# Patient Record
Sex: FEMALE | Race: WHITE | NOT HISPANIC OR LATINO | Employment: STUDENT | URBAN - METROPOLITAN AREA
[De-identification: names, ages, dates, MRNs, and addresses within clinical notes are randomized per-mention and may not be internally consistent; named-entity substitution may affect disease eponyms.]

---
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
## 2021-05-16 ENCOUNTER — COMMUNICATION - HEALTHEAST (OUTPATIENT)
Dept: SCHEDULING | Facility: CLINIC | Age: 21
End: 2021-05-16

## 2021-05-16 DIAGNOSIS — N92.0 MENORRHAGIA WITH REGULAR CYCLE: ICD-10-CM

## 2021-05-18 RX ORDER — NORGESTIMATE AND ETHINYL ESTRADIOL
KIT
Qty: 84 TABLET | Refills: 0 | Status: SHIPPED | OUTPATIENT
Start: 2021-05-18 | End: 2021-08-18

## 2021-05-27 ASSESSMENT — PATIENT HEALTH QUESTIONNAIRE - PHQ9: SUM OF ALL RESPONSES TO PHQ QUESTIONS 1-9: 0

## 2021-05-29 NOTE — PROGRESS NOTES
[facility] Well Child Check    ASSESSMENT & PLAN  Olga Hinson is a 18  y.o. 5  m.o. who has normal growth and normal development.    Diagnoses and all orders for this visit:    Menorrhagia with regular cycle  We will start the patient on low-dose oral contraceptives today.  Discussed the risks and benefits of the medication.  No personal or family history of blood clots.  No personal history of headaches with aura.    Discussed how to take the pill.  She is not currently sexually active but did discuss that it will take 14 days to become an effective form of birth control.  Discussed that it will not prevent sexually transmitted diseases.  -     drospirenone-ethinyl estradiol (RAFFI, 28,) 3-0.02 mg per tablet; Take 1 tablet by mouth daily.  Dispense: 3 Package; Refill: 3    Encounter for routine child health examination without abnormal findings  -     Meningococcal MCV4P  -     HPV vaccine 9 valent 3 dose IM        Return to clinic in 1 year for a Well Child Check or sooner as needed    IMMUNIZATIONS/LABS  Immunizations were reviewed and orders were placed as appropriate.    REFERRALS  Dental:  Recommend routine dental care as appropriate.  Other:  No additional referrals were made at this time.    ANTICIPATORY GUIDANCE  I have reviewed age appropriate anticipatory guidance.    HEALTH HISTORY  Do you have any concerns that you'd like to discuss today?: Listed      Refills needed? No    Do you have any forms that need to be filled out? Yes Sports Px   Are you using a form of contraception? No    If no, would you like to discuss with your clinician today? Yes        Do you have any significant health concerns in your family history?: No  Family History   Problem Relation Age of Onset     No Medical Problems Mother      No Medical Problems Father      Since your last visit, have there been any major changes in your family, such as a move, job change, separation, divorce, or death in the family?: No  Has a lack of  transportation kept you from medical appointments?: No    Home  Who lives in your home?:  Mom, Dad, 2 Sisters and 1 Brother  Social History     Social History Narrative     Not on file     Do you have any concerns about losing your housing?: No  Is your housing safe and comfortable?: Yes  Do you have any trouble with sleep?:  No    Education  What school do you child attend?:  [employer] Currently  What grade are you in?:  11th  How do you perform in school (grades, behavior, attention, homework?: A's and B's Good student     Eating  Do you eat regular meals including fruits and vegetables?:  yes  What are you drinking (cow's milk, water, soda, juice, sports drinks, energy drinks, etc)?: water  Have you been worried that you don't have enough food?: No  Do you have concerns about your body or appearance?:  No    Activities  Do you have friends?:  yes  Do you get at least one hour of physical activity per day?:  no  How many hours a day are you in front of a screen other than for schoolwork (computer, TV, phone)?:  3  What do you do for exercise?:  Swimming in the [employer] [place]  Do you have interest/participate in community activities/volunteers/school sports?:  no    MENTAL HEALTH SCREENING  PHQ-2 Total Score: 0 (6/11/2019  5:14 PM)    PHQ-9 Total Score: 1 (6/11/2019  5:14 PM)      VISION/HEARING  Vision: Completed. See Results  Hearing:  Completed. See Results     Hearing Screening    125Hz 250Hz 500Hz 1000Hz 2000Hz 3000Hz 4000Hz 6000Hz 8000Hz   Right ear:   25 20 20  20 20    Left ear:   25 20 20  20 20       Visual Acuity Screening    Right eye Left eye Both eyes   Without correction:      With correction: 20/20 20/32    Comments: Plus Lens: Pass: blurring of vision with +2.50 lens glasses      TB Risk Assessment:  The patient and/or parent/guardian answer positive to:  patient and/or parent/guardian answer 'no' to all screening TB questions    Dyslipidemia Risk Screening  Have either of your parents or any of  "your grandparents had a stroke or heart attack before age 55?: No  Any parents with high cholesterol or currently taking medications to treat?: Yes: Dad might- she's not sure     Dental  When was the last time you saw the dentist?: 6-12 months ago   Parent/Guardian declines the fluoride varnish application today. Fluoride not applied today.  Aged out.    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  Blood Pressure: 108/60  Blood pressure percentiles are 35 % systolic and 21 % diastolic based on the 2017 AAP Clinical Practice Guideline. Blood pressure percentile targets: 90: 125/78, 95: 129/82, 95 + 12 mmH/94.    PHYSICAL EXAM      General: Awake, Alert and Cooperative   Head: Normocephalic and Atraumatic   Eyes: PERRL and EOMI   ENT: Normal pearly TMs bilaterally and Oropharynx clear   Neck: Supple and Thyroid without enlargement or nodules   Chest: Chest wall normal   Lungs: Clear to auscultation bilaterally   Heart:: Regular rate and rhythm and no murmurs   Abdomen: Soft, nontender, nondistended and no hepatosplenomegaly   :  normal external female genitalia   Spine: Spine straight without curvature noted    Musculoskeletal: Moving all extremities, Normal tone and Full range of motion of the extremities   Neuro: Alert and oriented times 3, Normal tone in upper and lower extremities and Grossly normal   Skin: No rashes or lesions noted                 "

## 2021-05-30 VITALS — HEIGHT: 67 IN | WEIGHT: 179 LBS | BODY MASS INDEX: 28.09 KG/M2

## 2021-05-30 NOTE — TELEPHONE ENCOUNTER
Medication Question or Clarification  Who is calling: Other: Patient's Mother  What medication are you calling about? (include dose and sig)   drospirenone-ethinyl estradiol (RAFFI, 28,) 3-0.02 mg per tablet 3 Package 3 6/10/2019     Sig - Route: Take 1 tablet by mouth daily. - Oral    Sent to pharmacy as: drospirenone-ethinyl estradiol (RAFFI, 28,) 3-0.02 mg per tablet        Who prescribed the medication?: Celia Harding MD  What is your question/concern?: Patient's mother stated due to their insurance, the above prescription will need to be re-submitted to [facility].  Pharmacy: [facility]  Okay to leave a detailed message?: Yes  Site CMT - Please call the pharmacy to obtain any additional needed information.

## 2021-06-03 VITALS — BODY MASS INDEX: 28.61 KG/M2 | HEIGHT: 67 IN | WEIGHT: 182.31 LBS

## 2021-06-04 VITALS
DIASTOLIC BLOOD PRESSURE: 76 MMHG | RESPIRATION RATE: 16 BRPM | WEIGHT: 160 LBS | BODY MASS INDEX: 25.11 KG/M2 | SYSTOLIC BLOOD PRESSURE: 131 MMHG | HEIGHT: 67 IN | HEART RATE: 90 BPM

## 2021-06-07 NOTE — PROGRESS NOTES
"Olga Hinson is a 19 y.o. female who is being evaluated via a billable telephone visit.      The patient has been notified of following:     \"This telephone visit will be conducted via a call between you and your physician/provider. We have found that certain health care needs can be provided without the need for a physical exam.  This service lets us provide the care you need with a short phone conversation.  If a prescription is necessary we can send it directly to your pharmacy.  If lab work is needed we can place an order for that and you can then stop by our lab to have the test done at a later time.    If during the course of the call the physician/provider feels a telephone visit is not appropriate, you will not be charged for this service.\"         Olga Hinson complains of    Chief Complaint   Patient presents with     Medication Refill       I have reviewed and updated the patient's Past Medical History, Social History, Family History and Medication List.    ALLERGIES  Patient has no known allergies.    Additional provider notes: Else is a very pleasant 19-year-old female who has requested a phone visit to discuss her medications.  The patient had been previously placed on Jenny last spring.  She took this medication with no difficulty until November when she started to feel very anxious.  She began to wonder if this was the medication and stopped taking it 3 weeks ago and her anxiety resolved.    She is still uncertain if it was the medication however she would like to try something else.  She is not currently sexually active.  She takes the medication for menstrual cycle control as well as for her acne.    She is not necessarily interested in an implant type of medication particularly with the COVID 19 current outbreak.  She may consider this sometime in the future.    Assessment/Plan:  1. Menorrhagia with regular cycle  Ortho Tri-Cyclen sent to the pharmacy.  She will contact me in the next month or 2 " if this is not working well if it is making her anxious and I am happy to switch her medications.  - norgestimate-ethinyl estradioL (ORTHO TRI-CYCLEN LO, 28,) 0.18/0.215/0.25 mg-25 mcg tablet; Take 1 tablet by mouth daily.  Dispense: 3 Package; Refill: 4        Phone call duration:  8 minutes    Celia Harding MD

## 2021-06-08 NOTE — PROGRESS NOTES
SUBJECTIVE:    This is a 16-year-old female brought to clinic by her mother as she has had a recent constellation of symptoms.  She has been complaining of a sore throat.  Also, she has been experiencing nausea and vomiting and her mother states that these symptoms are usually due to strep throat.  Other family members have been ill recently.  She denies ongoing cough.  She has not had ear pain.  She is not short of breath.  It does hurt to swallow.      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     No current facility-administered medications on file prior to visit.        No Known Allergies         A 12 point comprehensive review of systems was negative except as noted.      OBJECTIVE:     Vitals:    02/06/17 1516   BP: 100/64   Pulse: 116   Resp: 20   Temp: 99.8  F (37.7  C)       General: Alert, not acutely distressed   Head:  Atraumatic, normocephalic  Ears:  TMs normal pearly-gray  Eyes:  PERRL, extraocular movements are intact  Nose:  Septum midline  Throat:  Oral mucosa moist, oropharynx moderately erythematous without exudate  Neck:  Supple without adenopathy or thyromegaly   Cardiovascular: S1-S2 with regular rate and without murmur, rub, or gallop   Lungs:  Clear to auscultation bilaterally   Extremities: Without cyanosis or edema   Neuro:  CN II-XII appear intact        Laboratory Results:    Results for orders placed or performed in visit on 02/06/17   Rapid Strep A Screen-Throat   Result Value Ref Range    Rapid Strep A Antigen Group A Strep detected (!) No Group A Strep detected         ASSESSMENT AND PLAN:    1. Strep pharyngitis  2. Sore throat    Rapid strep test is positive  - Rapid Strep A Screen-Throat  Treat with  amoxicillin 500 mg by mouth twice a day ×10 days  Recommend increased fluids  Okay to give Tylenol or ibuprofen as needed  Follow-up recommended if not improving        Jose Raul Lopez MD      This note has been dictated and transcribed using voice recognition software.  Any grammatical or contextual distortions are unintentional and inherent to the software.

## 2021-06-10 NOTE — PROGRESS NOTES
[facility] Well Child Check    ASSESSMENT & PLAN  Olga Hinson is a 19 y.o. who has normal growth and normal development.    Diagnoses and all orders for this visit:    Routine general medical examination at a health care facility  -     HPV vaccine 9 valent 3 dose IM  -     Hearing Screening  -     Vision Screening  -     PHQ9 Depression Screen    Other orders  -     Meningococcal B (PF)    She is taking OCPs for dysmenorrhea.  She denies any sexual activity so declines chlamydia screening.    Return to clinic in 1 year for a Well Child Check or sooner as needed    IMMUNIZATIONS/LABS  Immunizations were reviewed and orders were placed as appropriate.    REFERRALS  Dental:  The patient has already established care with a dentist.  Other:  No additional referrals were made at this time.    ANTICIPATORY GUIDANCE  I have reviewed age appropriate anticipatory guidance.    HEALTH HISTORY  Do you have any concerns that you'd like to discuss today?: No concerns       Refills needed? No    Do you have any forms that need to be filled out? No        Do you have any significant health concerns in your family history?: No  Family History   Problem Relation Age of Onset     No Medical Problems Mother      No Medical Problems Father      Since your last visit, have there been any major changes in your family, such as a move, job change, separation, divorce, or death in the family?: No  Has a lack of transportation kept you from medical appointments?: No    Home  Who lives in your home?:  Mom Dad and siblings  Social History     Social History Narrative     Not on file     Do you have any concerns about losing your housing?: No  Is your housing safe and comfortable?: Yes  Do you have any trouble with sleep?:  No    Education  What school do you child attend?:  [employer]  What grade are you in?:  [employer]  How do you perform in school (grades, behavior, attention, homework?: Good     Eating  Do you eat regular  meals including fruits and vegetables?:  yes  What are you drinking (cow's milk, water, soda, juice, sports drinks, energy drinks, etc)?: cow's milk- 1%  Have you been worried that you don't have enough food?: No  Do you have concerns about your body or appearance?:  No    Activities  Do you have friends?:  yes  Do you get at least one hour of physical activity per day?:  yes  How many hours a day are you in front of a screen other than for schoolwork (computer, TV, phone)?:  3  What do you do for exercise?:  Gym - walks  Do you have interest/participate in community activities/volunteers/school sports?:  no    VISION/HEARING  Vision: Patient is already followed by a vision specialist  Hearing:  Completed. See Results     Hearing Screening    125Hz 250Hz 500Hz 1000Hz 2000Hz 3000Hz 4000Hz 6000Hz 8000Hz   Right ear:   25 20 20  20 20    Left ear:   25 20 20  20 20    Vision Screening Comments: See eye specialist annually - Last appt 7/27/2020    MENTAL HEALTH SCREENING  No flowsheet data found.  Social-emotional & mental health screening:   PHQ 8/10/2020   PHQ-9 Total Score 0   Q9: Thoughts of better off dead/self-harm past 2 weeks Not at all       No concerns    TB Risk Assessment:  The patient and/or parent/guardian answer positive to:  no known risk of TB    Dyslipidemia Risk Screening  Have either of your parents or any of your grandparents had a stroke or heart attack before age 55?: No  Any parents with high cholesterol or currently taking medications to treat?: No     Dental  When was the last time you saw the dentist?: 6-12 months ago   Parent/Guardian declines the fluoride varnish application today. Fluoride not applied today.    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  Blood Pressure: 131/76  Blood pressure percentiles are not available for patients who are 18 years or older.    PHYSICAL EXAM  Physical Exam     General: Awake, Alert and Cooperative   Head: Normocephalic and Atraumatic   Eyes: PERRL and EOMI   ENT: Normal pearly TMs bilaterally and Oropharynx clear   Neck: Supple and Thyroid without enlargement or nodules   Chest: Chest wall normal   Lungs: Clear to auscultation bilaterally   Heart:: Regular rate and rhythm and no murmurs   Abdomen: Soft, nontender, nondistended and no hepatosplenomegaly   : declined   Spine: Spine straight without curvature noted and Curvature of the spine noted on forward bending   Musculoskeletal: Moving all extremities and No pain in the extremities   Neuro: Alert and oriented times 3, Normal tone in upper and lower extremities, Cranial nerves 2-12 intact and Grossly normal   Skin: No lesions or rashes noted       "

## 2021-06-17 NOTE — TELEPHONE ENCOUNTER
Refill Approved    Rx renewed per Medication Renewal Policy. Medication was last renewed on 4/14/2020.    Ov: 8/10/2020    Sally Soares, [facility] Triage/Med Refill 5/18/2021     Requested Prescriptions   Pending Prescriptions Disp Refills     TRI-LO-CON 0.18/0.215/0.25 mg-25 mcg tablet [Pharmacy Med Name: NORG/E ES(TRI-LO-CON)TB 28] 84 tablet 3     Sig: TAKE 1 TABLET DAILY       Oral Contraceptives Protocol Passed - 5/16/2021 11:46 PM        Passed - Visit with PCP or prescribing provider visit in last 12 months      Last office visit with prescriber/PCP: Visit date not found OR same dept: Visit date not found OR same specialty: Visit date not found  Last physical: 6/10/2019 Last MTM visit: Visit date not found   Next visit within 3 mo: Visit date not found  Next physical within 3 mo: Visit date not found  Prescriber OR PCP: Celia Harding MD  Last diagnosis associated with med order: 1. Menorrhagia with regular cycle  - TRI-LO-CON 0.18/0.215/0.25 mg-25 mcg tablet [Pharmacy Med Name: NORG/E ES(TRI-LO-CON)TB 28]; TAKE 1 TABLET DAILY  Dispense: 84 tablet; Refill: 3    If protocol passes may refill for 12 months if within 3 months of last provider visit (or a total of 15 months).

## 2021-06-18 NOTE — PATIENT INSTRUCTIONS - HE
Patient Instructions by Kristie Claire MD at 8/10/2020 11:00 AM     Author: Kristie Claire MD Service: -- Author Type: Physician    Filed: 8/10/2020 11:14 AM Encounter Date: 8/10/2020 Status: Signed    : Kristie Claire MD (Physician)          Patient Education      [facility] HANDOUT- PATIENT  18 THROUGH 21 YEAR VISITS  Here are some suggestions from [employer] experts that may be of value to your family.     HOW YOU ARE DOING  Enjoy spending time with your family.  Find activities you are really interested in, such as sports, theater, or volunteering.  Try to be responsible for your schoolwork or work obligations.  Always talk through problems and never use violence.  If you get angry with someone, try to walk away.  If you feel unsafe in your home or have been hurt by someone, let us know. Hotlines and community agencies can also provide confidential help.  Talk with us if you are worried about your living or food situation. Community agencies and programs such as SNAP can help.  Dont smoke, vape, or use drugs. Avoid people who do when you can. Talk with us if you are worried about alcohol or drug use in your family.    YOUR DAILY LIFE  Visit the dentist at least twice a year.  Brush your teeth at least twice a day and floss once a day.  Be a healthy eater.  Have vegetables, fruits, lean protein, and whole grains at meals and snacks.  Limit fatty, sugary, salty foods that are low in nutrients, such as candy, chips, and ice cream.  Eat when youre hungry. Stop when you feel satisfied.  Eat breakfast.  Drink plenty of water.  Make sure to get enough calcium every day.  Have 3 or more servings of low-fat (1%) or fat-free milk and other low-fat dairy products, such as yogurt and cheese.  Women: Make sure to eat foods rich in folate, such as fortified grains and dark- green leafy vegetables.  Aim for at least 1 hour of physical activity every day.  Wear safety equipment when you play sports.  Get enough  sleep.  Talk with us about managing your health care and insurance as an adult.    YOUR FEELINGS  Most people have ups and downs. If you are feeling sad, depressed, nervous, irritable, hopeless, or angry, let us know or reach out to another health care professional.  Figure out healthy ways to deal with stress.  Try your best to solve problems and make decisions on your own.  Sexuality is an important part of your life. If you have any questions or concerns, we are here for you.    HEALTHY BEHAVIOR CHOICES  Avoid using drugs, alcohol, tobacco, steroids, and diet pills. Support friends who choose not to use.  If you use drugs or alcohol, let us know or talk with another trusted adult about it. We can help you with quitting or cutting down on your use.  Make healthy decisions about your sexual behavior.  If you are sexually active, always practice safe sex. Always use birth control along with a condom to prevent pregnancy and sexually transmitted infections.  All sexual activity should be something you want. No one should ever force or try to convince you.  Protect your hearing at work, home, and concerts. Keep your earbud volume down.    STAYING SAFE  Always be a safe and cautious .  Insist that everyone use a lap and shoulder seat belt.  Limit the number of friends in the car and avoid driving at night.  Avoid distractions. Never text or talk on the phone while you drive.  Do not ride in a vehicle with someone who has been using drugs or alcohol.  If you feel unsafe driving or riding with someone, call someone you trust to drive you.  Wear helmets and protective gear while playing sports. Wear a helmet when riding a bike, a motorcycle, or an ATV or when skiing or skateboarding.  Always use sunscreen and a hat when youre outside.  Fighting and carrying weapons can be dangerous. Talk with your parents, teachers, or doctor about how to avoid these situations.      Helpful Resources:  National Domestic Violence  Hotline: 764.957.1505   Consistent with Bright Futures: Guidelines for Health Supervision of Infants, Children, and Adolescents, 4th Edition  For more information, go to https://brightfutures.aap.org.

## 2021-06-20 ENCOUNTER — HEALTH MAINTENANCE LETTER (OUTPATIENT)
Age: 21
End: 2021-06-20

## 2021-08-15 DIAGNOSIS — N92.0 MENORRHAGIA WITH REGULAR CYCLE: ICD-10-CM

## 2021-08-18 RX ORDER — NORGESTIMATE AND ETHINYL ESTRADIOL 7DAYSX3 LO
1 KIT ORAL DAILY
Qty: 84 TABLET | Refills: 0 | Status: SHIPPED | OUTPATIENT
Start: 2021-08-18 | End: 2021-12-26

## 2021-08-18 NOTE — TELEPHONE ENCOUNTER
"Routing refill request to provider for review/approval because:  Patient needs to be seen because it has been more than 1 year since last office visit.  No established PCP    Last Written Prescription Date:  5/18/21  Last Fill Quantity: 84,  # refills: 0   Last office visit provider:  8/10/20     Requested Prescriptions   Pending Prescriptions Disp Refills     TRI-LO-CON 0.18/0.215/0.25 MG-25 MCG tablet [Pharmacy Med Name: IAN/E ES(TRI-LO-CON)TB 28] 84 tablet 3     Sig: TAKE 1 TABLET DAILY       Contraceptives Protocol Failed - 8/15/2021 11:30 PM        Failed - Recent (12 mo) or future (30 days) visit within the authorizing provider's specialty     Patient has had an office visit with the authorizing provider or a provider within the authorizing providers department within the previous 12 mos or has a future within next 30 days. See \"Patient Info\" tab in inbasket, or \"Choose Columns\" in Meds & Orders section of the refill encounter.              Passed - Patient is not a current smoker if age is 35 or older        Passed - Medication is active on med list        Passed - No active pregnancy on record        Passed - No positive pregnancy test in past 12 months             giuliana pryor RN 08/17/21 8:48 PM  "

## 2021-10-11 ENCOUNTER — HEALTH MAINTENANCE LETTER (OUTPATIENT)
Age: 21
End: 2021-10-11

## 2021-12-26 ENCOUNTER — MYC REFILL (OUTPATIENT)
Dept: FAMILY MEDICINE | Facility: CLINIC | Age: 21
End: 2021-12-26
Payer: COMMERCIAL

## 2021-12-26 DIAGNOSIS — N92.0 MENORRHAGIA WITH REGULAR CYCLE: ICD-10-CM

## 2021-12-27 RX ORDER — NORGESTIMATE AND ETHINYL ESTRADIOL 7DAYSX3 LO
1 KIT ORAL DAILY
Qty: 84 TABLET | Refills: 0 | Status: SHIPPED | OUTPATIENT
Start: 2021-12-27 | End: 2022-02-11

## 2021-12-27 NOTE — TELEPHONE ENCOUNTER
Routing refill request to provider for review/approval because:  Patient needs to be seen because it has been more than 1 year since last office visit.    Earl Maya RN

## 2022-02-11 ENCOUNTER — OFFICE VISIT (OUTPATIENT)
Dept: FAMILY MEDICINE | Facility: CLINIC | Age: 22
End: 2022-02-11
Payer: COMMERCIAL

## 2022-02-11 VITALS
OXYGEN SATURATION: 99 % | HEART RATE: 100 BPM | DIASTOLIC BLOOD PRESSURE: 82 MMHG | WEIGHT: 169 LBS | BODY MASS INDEX: 26.53 KG/M2 | HEIGHT: 67 IN | SYSTOLIC BLOOD PRESSURE: 124 MMHG | RESPIRATION RATE: 16 BRPM | TEMPERATURE: 99.8 F

## 2022-02-11 DIAGNOSIS — Z00.00 HEALTHCARE MAINTENANCE: Primary | ICD-10-CM

## 2022-02-11 DIAGNOSIS — N92.0 MENORRHAGIA WITH REGULAR CYCLE: ICD-10-CM

## 2022-02-11 PROCEDURE — 90471 IMMUNIZATION ADMIN: CPT | Performed by: FAMILY MEDICINE

## 2022-02-11 PROCEDURE — 90651 9VHPV VACCINE 2/3 DOSE IM: CPT | Performed by: FAMILY MEDICINE

## 2022-02-11 PROCEDURE — 99395 PREV VISIT EST AGE 18-39: CPT | Mod: 25 | Performed by: FAMILY MEDICINE

## 2022-02-11 RX ORDER — NORGESTIMATE AND ETHINYL ESTRADIOL 7DAYSX3 LO
1 KIT ORAL DAILY
Qty: 84 TABLET | Refills: 4 | Status: SHIPPED | OUTPATIENT
Start: 2022-02-11 | End: 2022-06-08

## 2022-02-11 ASSESSMENT — ENCOUNTER SYMPTOMS
HEMATURIA: 0
SORE THROAT: 0
FEVER: 0
JOINT SWELLING: 0
HEADACHES: 0
DYSURIA: 0
CONSTIPATION: 0
WEAKNESS: 0
COUGH: 0
HEMATOCHEZIA: 0
SHORTNESS OF BREATH: 0
PALPITATIONS: 0
NERVOUS/ANXIOUS: 0
HEARTBURN: 0
DIZZINESS: 0
CHILLS: 0
FREQUENCY: 0
EYE PAIN: 0
PARESTHESIAS: 0
NAUSEA: 0
MYALGIAS: 0
DIARRHEA: 0
ABDOMINAL PAIN: 0
ARTHRALGIAS: 0
BREAST MASS: 0

## 2022-02-11 ASSESSMENT — MIFFLIN-ST. JEOR: SCORE: 1569.21

## 2022-02-11 NOTE — PROGRESS NOTES
Answers for HPI/ROS submitted by the patient on 2/11/2022  Frequency of exercise:: 1 day/week  Getting at least 3 servings of Calcium per day:: Yes  Diet:: Regular (no restrictions)  Taking medications regularly:: Yes  Medication side effects:: None  Bi-annual eye exam:: Yes  Dental care twice a year:: Yes  Sleep apnea or symptoms of sleep apnea:: None  abdominal pain: No  Blood in stool: No  Blood in urine: No  chest pain: No  chills: No  congestion: No  constipation: No  cough: No  diarrhea: No  dizziness: No  ear pain: No  eye pain: No  nervous/anxious: No  fever: No  frequency: No  genital sores: No  headaches: No  hearing loss: No  heartburn: No  arthralgias: No  joint swelling: No  peripheral edema: No  mood changes: No  myalgias: No  nausea: No  dysuria: No  palpitations: No  Skin sensation changes: No  sore throat: No  urgency: No  rash: No  shortness of breath: No  visual disturbance: No  weakness: No  pelvic pain: No  vaginal bleeding: No  vaginal discharge: No  tenderness: No  breast mass: No  breast discharge: No  Additional concerns today:: No  Duration of exercise:: Less than 15 minutes        Assessment/Plan:     Health maintenance female exam.  All questions answered.  Breast self exam technique reviewed and patient encouraged to perform self-exam monthly.      Healthcare maintenance    Menorrhagia with regular cycle  - norgestim-eth estrad triphasic (TRI-LO-CON) 0.18/0.215/0.25 MG-25 MCG tablet  Dispense: 84 tablet; Refill: 4        Patient has been advised of split billing requirements and indicates understanding: Yes      Subjective:     Olga Hinson is a 21 year old female who presents for an annual exam.  She is overall doing well.  No questions/concerns.  Needs refill of OCP which she takes for irregular/heavy cycles.    Healthy Habits:   Regular Exercise: Yes  Sunscreen Use: Yes  Healthy Diet: yes  Dental Visits Regularly: yes  Seat Belt: Yes  Self Breast Exam Monthly:  occasional  Prevention of Osteoporosis: yes    Immunization History   Administered Date(s) Administered     COVID-19,PF,Pfizer (12+ Yrs) 08/09/2021, 08/30/2021     DTAP (<7y) 02/20/2002, 04/22/2002, 07/03/2002, 04/02/2003, 12/27/2006     FLU 6-35 months 09/24/2009     Flu, Unspecified 10/19/2007, 10/18/2008     HPV9 06/10/2019, 08/10/2020, 02/11/2022     HepA-Adult 08/21/2014     HepA-ped 2 Dose 11/02/2015     HepB, Unspecified 02/20/2002, 04/22/2002, 04/02/2003     Hib, Unspecified 02/20/2002, 04/22/2002, 04/02/2003     Influenza (IIV3) PF 12/03/2005     Influenza Vaccine, 6+MO IM (QUADRIVALENT W/PRESERVATIVES) 11/05/2010, 10/26/2011, 10/17/2012, 11/02/2020     MMR 01/02/2003, 12/27/2006     Meningococcal (Bexsero ) 08/10/2020, 09/24/2020     Meningococcal (Menactra ) 07/23/2010, 08/21/2014, 06/10/2019     Pneumococcal (PCV 7) 02/20/2002, 04/22/2002, 07/03/2002     Poliovirus, inactivated (IPV) 02/20/2002, 07/03/2002, 12/27/2006, 08/21/2014     Tdap (Adacel,Boostrix) 07/23/2010, 08/21/2014     Varicella 02/16/2005, 12/27/2006         Gynecologic History  Patient's last menstrual period was 01/11/2022 (within days).  Contraception: oral contraceptive      OB History   No obstetric history on file.       Current Outpatient Medications   Medication Sig Dispense Refill     norgestim-eth estrad triphasic (TRI-LO-CON) 0.18/0.215/0.25 MG-25 MCG tablet Take 1 tablet by mouth daily 84 tablet 4     Past Medical History:   Diagnosis Date     Closed Fracture Of The Distal End Of The Radius     Created by Conversion      Past Surgical History:   Procedure Laterality Date     WISDOM TOOTH EXTRACTION       Patient has no known allergies.  Family History   Problem Relation Age of Onset     No Known Problems Mother      No Known Problems Father      Social History     Socioeconomic History     Marital status: Single     Spouse name: Not on file     Number of children: Not on file     Years of education: Not on file     Highest  "education level: Not on file   Occupational History     Not on file   Tobacco Use     Smoking status: Never Smoker     Smokeless tobacco: Never Used   Substance and Sexual Activity     Alcohol use: Never     Drug use: Not on file     Sexual activity: Never   Other Topics Concern     Not on file   Social History Narrative     Not on file     Social Determinants of Health     Financial Resource Strain: Not on file   Food Insecurity: Not on file   Transportation Needs: Not on file   Physical Activity: Not on file   Stress: Not on file   Social Connections: Not on file   Intimate Partner Violence: Not on file   Housing Stability: Not on file       Review of Systems  12 point review of systems was completed and found to be negative except for what is been stated above.      Objective:      Vitals:    02/11/22 1402 02/11/22 1413   BP: (!) 142/68 124/82   Pulse: 100    Resp: 16    Temp: 99.8  F (37.7  C)    TempSrc: Tympanic    SpO2: 99%    Weight: 76.7 kg (169 lb)    Height: 1.702 m (5' 7\")          Physical Exam:  General Appearance: Alert, cooperative, no distress, appears stated age   Head: Normocephalic, without obvious abnormality, atraumatic  Eyes: PERRL, conjunctiva/corneas clear, EOM's intact   Ears: Normal TM's and external ear canals, both ears  Neck: Supple, symmetrical, trachea midline, no adenopathy;  thyroid: not enlarged, symmetric, no tenderness/mass/nodules  Back: Symmetric, no curvature, ROM normal,  Lungs: Clear to auscultation bilaterally, respirations unlabored  Breasts: No breast masses, tenderness, asymmetry, or nipple discharge.  Heart: Regular rate and rhythm, S1 and S2 normal, no murmur, rub, or gallop  Abdomen: Soft, non-tender, bowel sounds active all four quadrants,  no masses, no organomegaly  Extremities: Extremities normal, atraumatic, no cyanosis or edema  Skin: Skin color, texture, turgor normal, no rashes or lesions  Lymph nodes: Cervical, supraclavicular, and axillary nodes normal and "   Neurologic: Normal

## 2022-04-24 ASSESSMENT — ANXIETY QUESTIONNAIRES
4. TROUBLE RELAXING: NOT AT ALL
3. WORRYING TOO MUCH ABOUT DIFFERENT THINGS: MORE THAN HALF THE DAYS
1. FEELING NERVOUS, ANXIOUS, OR ON EDGE: SEVERAL DAYS
7. FEELING AFRAID AS IF SOMETHING AWFUL MIGHT HAPPEN: SEVERAL DAYS
2. NOT BEING ABLE TO STOP OR CONTROL WORRYING: SEVERAL DAYS
6. BECOMING EASILY ANNOYED OR IRRITABLE: NOT AT ALL
GAD7 TOTAL SCORE: 7
GAD7 TOTAL SCORE: 7
5. BEING SO RESTLESS THAT IT IS HARD TO SIT STILL: MORE THAN HALF THE DAYS
7. FEELING AFRAID AS IF SOMETHING AWFUL MIGHT HAPPEN: SEVERAL DAYS
GAD7 TOTAL SCORE: 7

## 2022-04-25 ENCOUNTER — VIRTUAL VISIT (OUTPATIENT)
Dept: FAMILY MEDICINE | Facility: CLINIC | Age: 22
End: 2022-04-25
Payer: COMMERCIAL

## 2022-04-25 DIAGNOSIS — F41.9 ANXIETY: Primary | ICD-10-CM

## 2022-04-25 PROCEDURE — 99214 OFFICE O/P EST MOD 30 MIN: CPT | Mod: GT | Performed by: FAMILY MEDICINE

## 2022-04-25 RX ORDER — FLUOXETINE 10 MG/1
CAPSULE ORAL
Qty: 50 CAPSULE | Refills: 0 | Status: SHIPPED | OUTPATIENT
Start: 2022-04-25 | End: 2023-02-24 | Stop reason: DRUGHIGH

## 2022-04-25 ASSESSMENT — ANXIETY QUESTIONNAIRES: GAD7 TOTAL SCORE: 7

## 2022-04-25 NOTE — PROGRESS NOTES
"Olga is a 21 year old who is being evaluated via a billable video visit.      How would you like to obtain your AVS? [email or web address]  If the video visit is dropped, the invitation should be resent by: Text to cell phone: [phone]  Will anyone else be joining your video visit? No      Video Start Time: 12:52 PM    -------------------------    Assessment/Plan:    Olga Hinson is a 21 year old female presenting for:    Anxiety  Fluoxetine sent to the pharmacy.  We also discussed Vistaril as an \"as needed\" medication however she declined at this time.  Discussed that I think therapy would be helpful as well but she declines.  We discussed risks and benefits of the medication.  Discussed how to take the medication.  She will take 1 capsule daily for 10 days and then increase to 2 capsules daily thereafter.  She will follow-up with me via [employer] message in 3 to 4 weeks to let me know how she is doing.  - FLUoxetine (PROZAC) 10 MG capsule  Dispense: 50 capsule; Refill: 0        There are no discontinued medications.        Chief Complaint:  Anxiety          Subjective:   Olga Hinson is a pleasant 21 year old female being evaluated via video visit today for the following concern/s:     Anxiety: Patient has a past medical history significant for anxiety.  She is never been treated in the past.  She notes that her anxiety became a bit worse in high school.  She remembers feeling very anxious about test.  She states that the pandemic has made things worse.  She feels now that she gets anxious going out and doing things.  She states that she gets a feeling that \"something bad is going to happen.\"    She gives example that she had a her appointment a few days ago which she was looking forward to.  Unfortunately she began to feel very anxious and she needed to cancel.  She still states that sometimes when she goes out with her friends she feels anxious.    She is a good support system.  When she is at home she does not feel " overly anxious.  She states that when she is in high school she sometimes have panic episodes but she is not having this currently.  She is sleeping fairly well.      12 point review of systems completed and negative except for what has been described above    History   Smoking Status     Never Smoker   Smokeless Tobacco     Never Used         Current Outpatient Medications:      FLUoxetine (PROZAC) 10 MG capsule, Take 1 capsule (10 mg) by mouth daily for 10 days, THEN 2 capsules (20 mg) daily for 20 days., Disp: 50 capsule, Rfl: 0     norgestim-eth estrad triphasic (TRI-LO-CON) 0.18/0.215/0.25 MG-25 MCG tablet, Take 1 tablet by mouth daily, Disp: 84 tablet, Rfl: 4        Objective:  No vitals were done due to the nature of this visit  No flowsheet data found.            General: No acute distress  Psych: Appropriate affect  HEENT: moist mucous membranes  Pulmonary: Breathing comfortably, speaking in complete sentences  Extremities: warm and well perfused with no edema  Skin: warm and dry with no rash         This note has been dictated and transcribed using voice recognition software.   Any errors in transcription are unintentional and inherent to the software.       Video-Visit Details    Type of service:  Video Visit    Video End Time:1:04 PM    Originating Location (pt. Location): Home    Distant Location (provider location):  [facility]     Platform used for Video Visit: prollie for HPI/ROS submitted by the patient on 4/24/2022  ANTOINETTE 7 TOTAL SCORE: 7  Depression/Anxiety: Anxiety  Anxiety since last: : medium  Other associated symotome: : No  Significant life event: : No  Anxious:: Yes  Current substance use:: No  How many servings of fruits and vegetables do you eat daily?: 4 or more  On average, how many sweetened beverages do you drink each day (Examples: soda, juice, sweet tea, etc.  Do NOT count diet or artificially sweetened beverages)?: 1  How many minutes a day do you exercise  enough to make your heart beat faster?: 30 to 60  How many days a week do you exercise enough to make your heart beat faster?: 6  How many days per week do you miss taking your medication?: 0

## 2022-06-08 ENCOUNTER — TELEPHONE (OUTPATIENT)
Dept: FAMILY MEDICINE | Facility: CLINIC | Age: 22
End: 2022-06-08
Payer: COMMERCIAL

## 2022-06-08 DIAGNOSIS — N92.0 MENORRHAGIA WITH REGULAR CYCLE: ICD-10-CM

## 2022-06-08 RX ORDER — NORGESTIMATE AND ETHINYL ESTRADIOL 7DAYSX3 LO
1 KIT ORAL DAILY
Qty: 84 TABLET | Refills: 3 | Status: SHIPPED | OUTPATIENT
Start: 2022-06-08 | End: 2023-02-24

## 2022-06-08 NOTE — TELEPHONE ENCOUNTER
Prescription approved per [facility] Refill Protocol.    Call placed to [facility]   Detailed voicemail left  Cancelled remaining refills   Clinic RN call back number [phone] provided if questions/concerns    Earl Maya RN

## 2022-06-08 NOTE — TELEPHONE ENCOUNTER
[facility] - norgestim-eth estrad triphasic (TRI-LO-CON) 0.18/0.215/0.25 MG-25 MCG tablet  No need to call patient back, unless there are questions or problems.

## 2022-09-24 ENCOUNTER — HEALTH MAINTENANCE LETTER (OUTPATIENT)
Age: 22
End: 2022-09-24

## 2022-11-30 DIAGNOSIS — F41.9 ANXIETY: ICD-10-CM

## 2023-02-17 ASSESSMENT — ENCOUNTER SYMPTOMS
HEARTBURN: 0
NERVOUS/ANXIOUS: 0
ABDOMINAL PAIN: 0
FREQUENCY: 0
FEVER: 0
DYSURIA: 0
SORE THROAT: 0
JOINT SWELLING: 0
DIZZINESS: 0
ARTHRALGIAS: 0
MYALGIAS: 0
COUGH: 0
BREAST MASS: 0
HEMATURIA: 0
NAUSEA: 0
HEADACHES: 0
SHORTNESS OF BREATH: 0
CHILLS: 0
PARESTHESIAS: 0
DIARRHEA: 0
CONSTIPATION: 0
WEAKNESS: 0
PALPITATIONS: 0
HEMATOCHEZIA: 0
EYE PAIN: 0

## 2023-02-24 ENCOUNTER — OFFICE VISIT (OUTPATIENT)
Dept: FAMILY MEDICINE | Facility: CLINIC | Age: 23
End: 2023-02-24
Payer: COMMERCIAL

## 2023-02-24 VITALS
DIASTOLIC BLOOD PRESSURE: 80 MMHG | OXYGEN SATURATION: 100 % | BODY MASS INDEX: 25.43 KG/M2 | RESPIRATION RATE: 20 BRPM | WEIGHT: 162 LBS | HEIGHT: 67 IN | HEART RATE: 103 BPM | TEMPERATURE: 98.2 F | SYSTOLIC BLOOD PRESSURE: 118 MMHG

## 2023-02-24 DIAGNOSIS — N92.0 MENORRHAGIA WITH REGULAR CYCLE: ICD-10-CM

## 2023-02-24 DIAGNOSIS — Z12.4 CERVICAL CANCER SCREENING: ICD-10-CM

## 2023-02-24 DIAGNOSIS — Z00.00 HEALTHCARE MAINTENANCE: Primary | ICD-10-CM

## 2023-02-24 DIAGNOSIS — F41.9 ANXIETY: ICD-10-CM

## 2023-02-24 PROCEDURE — 99395 PREV VISIT EST AGE 18-39: CPT | Performed by: FAMILY MEDICINE

## 2023-02-24 PROCEDURE — G0145 SCR C/V CYTO,THINLAYER,RESCR: HCPCS | Performed by: FAMILY MEDICINE

## 2023-02-24 RX ORDER — NORGESTIMATE AND ETHINYL ESTRADIOL 7DAYSX3 LO
1 KIT ORAL DAILY
Qty: 84 TABLET | Refills: 3 | Status: SHIPPED | OUTPATIENT
Start: 2023-02-24 | End: 2024-03-01

## 2023-02-24 NOTE — PROGRESS NOTES
"Answers for HPI/ROS submitted by the patient on 2/17/2023  Frequency of exercise:: 4-5 days/week  Getting at least 3 servings of Calcium per day:: Yes  Diet:: Regular (no restrictions)  Taking medications regularly:: No  Bi-annual eye exam:: Yes  Dental care twice a year:: Yes  Sleep apnea or symptoms of sleep apnea:: None  abdominal pain: No  Blood in stool: No  Blood in urine: No  chest pain: No  chills: No  congestion: No  constipation: No  cough: No  diarrhea: No  dizziness: No  ear pain: No  eye pain: No  nervous/anxious: No  fever: No  frequency: No  genital sores: No  headaches: No  hearing loss: No  heartburn: No  arthralgias: No  joint swelling: No  peripheral edema: No  mood changes: No  myalgias: No  nausea: No  dysuria: No  palpitations: No  Skin sensation changes: No  sore throat: No  urgency: No  rash: No  shortness of breath: No  visual disturbance: No  weakness: No  pelvic pain: No  vaginal bleeding: No  vaginal discharge: No  tenderness: No  breast mass: No  breast discharge: No  Additional concerns today:: No  Duration of exercise:: 30-45 minutes        Assessment/Plan:     Health maintenance female exam.  All questions answered.  Await pap smear results.  Breast self exam technique reviewed and patient encouraged to perform self-exam monthly.  Discussed healthy lifestyle modifications.      BMI:   Estimated body mass index is 25.37 kg/m  as calculated from the following:    Height as of this encounter: 1.702 m (5' 7\").    Weight as of this encounter: 73.5 kg (162 lb).   Weight management plan: Discussed healthy diet and exercise guidelines      Healthcare maintenance  Is not currently and has not been sexually active and does not need gonorrhea chlamydia screening    Cervical cancer screening  - Pap Screen only - recommended age 21 - 24 years    Anxiety  Refill Prozac sent to the pharmacy  - FLUoxetine (PROZAC) 20 MG capsule  Dispense: 90 capsule; Refill: 3    Menorrhagia with regular " cycle  Refill  - norgestim-eth estrad triphasic (TRI-LO-CON) 0.18/0.215/0.25 MG-25 MCG tablet  Dispense: 84 tablet; Refill: 3        Patient has been advised of split billing requirements and indicates understanding: Yes      Subjective:     Olga Hinson is a 22 year old female who presents for an annual exam.  She is overall doing well.  She is currently in school for education.    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  She is currently working at school in [place] which she finds to be challenging but rewarding.    Healthy Habits:   Regular Exercise: Yes  Sunscreen Use: Yes  Healthy Diet: yes  Dental Visits Regularly: yes  Seat Belt: Yes  Self Breast Exam Monthly: no  Prevention of Osteoporosis: yes    Immunization History   Administered Date(s) Administered     COVID-19 Vaccine 12+ (Pfizer) 08/09/2021, 08/30/2021     DTAP (<7y) 02/20/2002, 04/22/2002, 07/03/2002, 04/02/2003, 12/27/2006     FLU 6-35 months 09/24/2009     Flu, Unspecified 10/19/2007, 10/18/2008     HPV9 06/10/2019, 08/10/2020, 02/11/2022     HepA-Adult 08/21/2014     HepA-ped 2 Dose 11/02/2015     HepB, Unspecified 02/20/2002, 04/22/2002, 04/02/2003     Hib, Unspecified 02/20/2002, 04/22/2002, 04/02/2003     Influenza (IIV3) PF 12/03/2005     Influenza Vaccine, 6+MO IM (QUADRIVALENT W/PRESERVATIVES) 11/05/2010, 10/26/2011, 10/17/2012, 11/02/2020     MMR 01/02/2003, 12/27/2006     Meningococcal ACWY (Menactra ) 07/23/2010, 08/21/2014, 06/10/2019     Meningococcal B (Bexsero ) 08/10/2020, 09/24/2020     Pneumococcal (PCV 7) 02/20/2002, 04/22/2002, 07/03/2002     Poliovirus, inactivated (IPV) 02/20/2002, 07/03/2002, 12/27/2006, 08/21/2014     Tdap (Adacel,Boostrix) 07/23/2010, 08/21/2014     Varicella 02/16/2005, 12/27/2006         Gynecologic History  Patient's last menstrual period was 02/10/2023 (approximate).  Contraception: abstinence  Last Pap: n/a.     OB History   No obstetric history on file.       Current Outpatient Medications  "  Medication Sig Dispense Refill     FLUoxetine (PROZAC) 20 MG capsule Take 1 capsule (20 mg) by mouth daily 90 capsule 3     norgestim-eth estrad triphasic (TRI-LO-CON) 0.18/0.215/0.25 MG-25 MCG tablet Take 1 tablet by mouth daily 84 tablet 3     Past Medical History:   Diagnosis Date     Closed Fracture Of The Distal End Of The Radius     Created by Conversion      Past Surgical History:   Procedure Laterality Date     WISDOM TOOTH EXTRACTION       Patient has no known allergies.  Family History   Problem Relation Age of Onset     No Known Problems Mother      No Known Problems Father      Social History     Socioeconomic History     Marital status: Single     Spouse name: Not on file     Number of children: Not on file     Years of education: Not on file     Highest education level: Not on file   Occupational History     Not on file   Tobacco Use     Smoking status: Never     Smokeless tobacco: Never   Substance and Sexual Activity     Alcohol use: Never     Drug use: Not on file     Sexual activity: Never   Other Topics Concern     Not on file   Social History Narrative     Not on file     Social Determinants of Health     Financial Resource Strain: Not on file   Food Insecurity: Not on file   Transportation Needs: Not on file   Physical Activity: Not on file   Stress: Not on file   Social Connections: Not on file   Intimate Partner Violence: Not on file   Housing Stability: Not on file       Review of Systems  12 point review of systems was completed and found to be negative except for what is been stated above.      Objective:      Vitals:    02/24/23 1325   BP: 118/80   Pulse: 103   Resp: 20   Temp: 98.2  F (36.8  C)   TempSrc: Tympanic   SpO2: 100%   Weight: 73.5 kg (162 lb)   Height: 1.702 m (5' 7\")         Physical Exam:  General Appearance: Alert, cooperative, no distress, appears stated age   Head: Normocephalic, without obvious abnormality, atraumatic  Eyes: PERRL, conjunctiva/corneas clear, EOM's " intact   Ears: Normal TM's and external ear canals, both ears  Neck: Supple, symmetrical, trachea midline, no adenopathy;  thyroid: not enlarged, symmetric, no tenderness/mass/nodules  Back: Symmetric, no curvature, ROM normal,  Lungs: Clear to auscultation bilaterally, respirations unlabored  Breasts: No breast masses, tenderness, asymmetry, or nipple discharge.  Heart: Regular rate and rhythm, S1 and S2 normal, no murmur, rub, or gallop  Abdomen: Soft, non-tender, bowel sounds active all four quadrants,  no masses, no organomegaly  Pelvic:normal external female genitalia, normal appearing vaginal mucosa and cervix  Extremities: Extremities normal, atraumatic, no cyanosis or edema  Skin: Skin color, texture, turgor normal, no rashes or lesions  Lymph nodes: Cervical, supraclavicular, and axillary nodes normal and   Neurologic: Normal

## 2023-02-28 LAB
BKR LAB AP GYN ADEQUACY: NORMAL
BKR LAB AP GYN INTERPRETATION: NORMAL
BKR LAB AP HPV REFLEX: NO
BKR LAB AP PREVIOUS ABNORMAL: NORMAL
PATH REPORT.COMMENTS IMP SPEC: NORMAL
PATH REPORT.COMMENTS IMP SPEC: NORMAL
PATH REPORT.RELEVANT HX SPEC: NORMAL

## 2024-01-25 ENCOUNTER — PATIENT OUTREACH (OUTPATIENT)
Dept: CARE COORDINATION | Facility: CLINIC | Age: 24
End: 2024-01-25
Payer: COMMERCIAL

## 2024-02-08 ENCOUNTER — PATIENT OUTREACH (OUTPATIENT)
Dept: CARE COORDINATION | Facility: CLINIC | Age: 24
End: 2024-02-08
Payer: COMMERCIAL

## 2024-02-19 DIAGNOSIS — F41.9 ANXIETY: ICD-10-CM

## 2024-02-28 SDOH — HEALTH STABILITY: PHYSICAL HEALTH: ON AVERAGE, HOW MANY MINUTES DO YOU ENGAGE IN EXERCISE AT THIS LEVEL?: 40 MIN

## 2024-02-28 SDOH — HEALTH STABILITY: PHYSICAL HEALTH: ON AVERAGE, HOW MANY DAYS PER WEEK DO YOU ENGAGE IN MODERATE TO STRENUOUS EXERCISE (LIKE A BRISK WALK)?: 6 DAYS

## 2024-02-28 ASSESSMENT — SOCIAL DETERMINANTS OF HEALTH (SDOH): HOW OFTEN DO YOU GET TOGETHER WITH FRIENDS OR RELATIVES?: TWICE A WEEK

## 2024-03-01 DIAGNOSIS — N92.0 MENORRHAGIA WITH REGULAR CYCLE: ICD-10-CM

## 2024-03-01 RX ORDER — NORGESTIMATE AND ETHINYL ESTRADIOL
1 KIT DAILY
Qty: 84 TABLET | Refills: 0 | Status: SHIPPED | OUTPATIENT
Start: 2024-03-01 | End: 2024-03-06

## 2024-03-06 ENCOUNTER — OFFICE VISIT (OUTPATIENT)
Dept: FAMILY MEDICINE | Facility: CLINIC | Age: 24
End: 2024-03-06
Payer: COMMERCIAL

## 2024-03-06 VITALS
HEART RATE: 108 BPM | BODY MASS INDEX: 29.37 KG/M2 | WEIGHT: 187.13 LBS | HEIGHT: 67 IN | OXYGEN SATURATION: 99 % | RESPIRATION RATE: 16 BRPM | SYSTOLIC BLOOD PRESSURE: 124 MMHG | DIASTOLIC BLOOD PRESSURE: 70 MMHG | TEMPERATURE: 98.3 F

## 2024-03-06 DIAGNOSIS — M62.89 PELVIC FLOOR DYSFUNCTION IN FEMALE: ICD-10-CM

## 2024-03-06 DIAGNOSIS — N92.0 MENORRHAGIA WITH REGULAR CYCLE: ICD-10-CM

## 2024-03-06 DIAGNOSIS — Z00.00 ROUTINE GENERAL MEDICAL EXAMINATION AT A HEALTH CARE FACILITY: Primary | ICD-10-CM

## 2024-03-06 PROCEDURE — 99395 PREV VISIT EST AGE 18-39: CPT | Performed by: FAMILY MEDICINE

## 2024-03-06 RX ORDER — NORGESTIMATE AND ETHINYL ESTRADIOL 7DAYSX3 LO
1 KIT ORAL DAILY
Qty: 84 TABLET | Refills: 4 | Status: SHIPPED | OUTPATIENT
Start: 2024-03-06 | End: 2024-06-09

## 2024-03-06 NOTE — PATIENT INSTRUCTIONS
Preventive Care Advice   This is general advice given by our system to help you stay healthy. However, your care team may have specific advice just for you. Please talk to your care team about your preventive care needs.  Nutrition  Eat 5 or more servings of fruits and vegetables each day.  Try wheat bread, brown rice and whole grain pasta (instead of white bread, rice, and pasta).  Get enough calcium and vitamin D. Check the label on foods and aim for 100% of the RDA (recommended daily allowance).  Lifestyle  Exercise at least 150 minutes each week   (30 minutes a day, 5 days a week).  Do muscle strengthening activities 2 days a week. These help control your weight and prevent disease.  No smoking.  Wear sunscreen to prevent skin cancer.  Have a dental exam and cleaning every 6 months.  Yearly exams  See your health care team every year to talk about:  Any changes in your health.  Any medicines your care team has prescribed.  Preventive care, family planning, and ways to prevent chronic diseases.  Shots (vaccines)   HPV shots (up to age 26), if you've never had them before.  Hepatitis B shots (up to age 59), if you've never had them before.  COVID-19 shot: Get this shot when it's due.  Flu shot: Get a flu shot every year.  Tetanus shot: Get a tetanus shot every 10 years.  Pneumococcal, hepatitis A, and RSV shots: Ask your care team if you need these based on your risk.  Shingles shot (for age 50 and up).  General health tests  Diabetes screening:  Starting at age 35, Get screened for diabetes at least every 3 years.  If you are younger than age 35, ask your care team if you should be screened for diabetes.  Cholesterol test: At age 39, start having a cholesterol test every 5 years, or more often if advised.  Bone density scan (DEXA): At age 50, ask your care team if you should have this scan for osteoporosis (brittle bones).  Hepatitis C: Get tested at least once in your life.  STIs (sexually transmitted  infections)  Before age 24: Ask your care team if you should be screened for STIs.  After age 24: Get screened for STIs if you're at risk. You are at risk for STIs (including HIV) if:  You are sexually active with more than one person.  You don't use condoms every time.  You or a partner was diagnosed with a sexually transmitted infection.  If you are at risk for HIV, ask about PrEP medicine to prevent HIV.  Get tested for HIV at least once in your life, whether you are at risk for HIV or not.  Cancer screening tests  Cervical cancer screening: If you have a cervix, begin getting regular cervical cancer screening tests at age 21. Most people who have regular screenings with normal results can stop after age 65. Talk about this with your provider.  Breast cancer scan (mammogram): If you've ever had breasts, begin having regular mammograms starting at age 40. This is a scan to check for breast cancer.  Colon cancer screening: It is important to start screening for colon cancer at age 45.  Have a colonoscopy test every 10 years (or more often if you're at risk) Or, ask your provider about stool tests like a FIT test every year or Cologuard test every 3 years.  To learn more about your testing options, visit: https://www.[employer]/459432.pdf.  For help making a decision, visit: https://bit.ly/fz31197.  Prostate cancer screening test: If you have a prostate and are age 55 to 69, ask your provider if you would benefit from a yearly prostate cancer screening test.  Lung cancer screening: If you are a current or former smoker age 50 to 80, ask your care team if ongoing lung cancer screenings are right for you.  For informational purposes only. Not to replace the advice of your health care provider. Copyright   2023 [employer]. All rights reserved. Clinically reviewed by the [facility]. [employer] [id number] - REV 01/24.

## 2024-03-06 NOTE — PROGRESS NOTES
"Preventive Care Visit  [facility] NATHEN Harding MD, Family Medicine  Mar 6, 2024    Assessment & Plan     Routine general medical examination at a health care facility  Overall doing well.  Working for the [employer] with [employer].  Going to school for primary education and will need to do her masters.  Also works as a .    Menorrhagia with regular cycle  Refill sent  - norgestim-eth estrad triphasic (TRI-LO-CON) 0.18/0.215/0.25 MG-25 MCG tablet; Take 1 tablet by mouth daily    Pelvic floor dysfunction in female  Do not note any residual hymen -pelvic floor muscles do feel very tight.  Will have her see physical therapy to see if this is helpful.  - Physical Therapy  Referral; Future              BMI  Estimated body mass index is 29.03 kg/m  as calculated from the following:    Height as of this encounter: 1.71 m (5' 7.32\").    Weight as of this encounter: 84.9 kg (187 lb 2 oz).   Weight management plan: Discussed healthy diet and exercise guidelines    Counseling  Appropriate preventive services were discussed with this patient, including applicable screening as appropriate for fall prevention, nutrition, physical activity, Tobacco-use cessation, weight loss and cognition.  Checklist reviewing preventive services available has been given to the patient.  Reviewed patient's diet, addressing concerns and/or questions.           Miriam Espinoza is a 23 year old, presenting for the following:  Physical      Health Care Directive  Patient does not have a Health Care Directive or Living Will: Discussed advance care planning with patient; however, patient declined at this time.    HPI  She is doing well overall.  Her main concern today is some pelvic pain.  She describes if she wears a super tampon she is able to insert it easily but has a difficult time removing it and it can be painful and she has to pull fairly hard.  She is also has a difficult time getting a menstrual cup in. "  She otherwise does not have pelvic pain.  Normal menstrual cycles.            2/28/2024   General Health   How would you rate your overall physical health? Excellent   Feel stress (tense, anxious, or unable to sleep) Not at all         2/28/2024   Nutrition   Three or more servings of calcium each day? (!) NO   Diet: Regular (no restrictions)   How many servings of fruit and vegetables per day? (!) 2-3   How many sweetened beverages each day? 0-1         2/28/2024   Exercise   Days per week of moderate/strenous exercise 6 days   Average minutes spent exercising at this level 40 min         2/28/2024   Social Factors   Frequency of gathering with friends or relatives Twice a week   Worry food won't last until get money to buy more No   Food not last or not have enough money for food? No   Do you have housing?  Yes   Are you worried about losing your housing? No   Lack of transportation? No   Unable to get utilities (heat,electricity)? No         2/28/2024   Dental   Dentist two times every year? Yes         2/28/2024   TB Screening   Were you born outside of [place]?  No         Today's PHQ-2 Score:       3/6/2024     8:02 AM   PHQ-2 ( 1999 [employer])   Q1: Little interest or pleasure in doing things 0   Q2: Feeling down, depressed or hopeless 0   PHQ-2 Score 0   Q1: Little interest or pleasure in doing things Not at all   Q2: Feeling down, depressed or hopeless Not at all   PHQ-2 Score 0           2/28/2024   Substance Use   Alcohol more than 3/day or more than 7/wk No   Do you use any other substances recreationally? No     Social History     Tobacco Use    Smoking status: Never    Smokeless tobacco: Never   Substance Use Topics    Alcohol use: Never             2/28/2024   One time HIV Screening   Previous HIV test? No         2/28/2024   STI Screening   New sexual partner(s) since last STI/HIV test? No     History of abnormal Pap smear: NO - age 21-29 PAP every 3 years recommended        2/24/2023     1:31 PM   PAP /  "HPV   PAP Negative for Intraepithelial Lesion or Malignancy (NILM)            2/28/2024   Contraception/Family Planning   Questions about contraception or family planning No        Reviewed and updated as needed this visit by Provider                          Review of Systems  Constitutional, HEENT, cardiovascular, pulmonary, GI, , musculoskeletal, neuro, skin, endocrine and psych systems are negative, except as otherwise noted.     Objective    Exam  /70   Pulse 108   Temp 98.3  F (36.8  C) (Tympanic)   Resp 16   Ht 1.71 m (5' 7.32\")   Wt 84.9 kg (187 lb 2 oz)   LMP 02/25/2024 (Within Days)   SpO2 99%   BMI 29.03 kg/m     Estimated body mass index is 29.03 kg/m  as calculated from the following:    Height as of this encounter: 1.71 m (5' 7.32\").    Weight as of this encounter: 84.9 kg (187 lb 2 oz).    Physical Exam    Physical Exam:  General Appearance: Alert, cooperative, no distress, appears stated age   Head: Normocephalic, without obvious abnormality, atraumatic  Eyes: PERRL, conjunctiva/corneas clear, EOM's intact   Ears: Normal TM's and external ear canals, both ears  Nose:Nares normal, septum midline,mucosa normal, no drainage    Throat:Lips, mucosa, and tongue normal; teeth and gums normal  Neck: Supple, symmetrical, trachea midline, no adenopathy;  thyroid: not enlarged, symmetric, no tenderness/mass/nodules  Back: Symmetric, no curvature, ROM normal,  Lungs: Clear to auscultation bilaterally, respirations unlabored  Breasts: No breast masses, tenderness, asymmetry, or nipple discharge.  Heart: Regular rate and rhythm, S1 and S2 normal, no murmur, rub, or gallop  Abdomen: Soft, non-tender, bowel sounds active all four quadrants,  no masses, no organomegaly  Pelvic:normal external female genitalia, patient does have some discomfort with a 2 finger look glove vaginal examination.  Using a small speculum this was inserted partially and no residual hymen noted.  Tight pelvic floor " muscles.  Extremities: Extremities normal, atraumatic, no cyanosis or edema  Skin: Skin color, texture, turgor normal, no rashes or lesions  Lymph nodes: Cervical, supraclavicular, and axillary nodes normal and   Neurologic: Normal        Signed Electronically by: Celia Harding MD

## 2024-05-20 DIAGNOSIS — F41.9 ANXIETY: ICD-10-CM

## 2024-06-09 ENCOUNTER — MYC REFILL (OUTPATIENT)
Dept: FAMILY MEDICINE | Facility: CLINIC | Age: 24
End: 2024-06-09
Payer: COMMERCIAL

## 2024-06-09 DIAGNOSIS — N92.0 MENORRHAGIA WITH REGULAR CYCLE: ICD-10-CM

## 2024-06-10 RX ORDER — NORGESTIMATE AND ETHINYL ESTRADIOL 7DAYSX3 LO
1 KIT ORAL DAILY
Qty: 84 TABLET | Refills: 3 | Status: SHIPPED | OUTPATIENT
Start: 2024-06-10

## 2024-09-04 ENCOUNTER — VIRTUAL VISIT (OUTPATIENT)
Dept: FAMILY MEDICINE | Facility: CLINIC | Age: 24
End: 2024-09-04
Payer: COMMERCIAL

## 2024-09-04 DIAGNOSIS — R05.1 ACUTE COUGH: Primary | ICD-10-CM

## 2024-09-04 PROCEDURE — 99213 OFFICE O/P EST LOW 20 MIN: CPT | Mod: 95 | Performed by: FAMILY MEDICINE

## 2024-09-04 RX ORDER — BENZONATATE 100 MG/1
100 CAPSULE ORAL 3 TIMES DAILY PRN
Qty: 30 CAPSULE | Refills: 0 | Status: SHIPPED | OUTPATIENT
Start: 2024-09-04

## 2024-09-04 ASSESSMENT — ENCOUNTER SYMPTOMS
FEVER: 1
COUGH: 1

## 2024-09-04 NOTE — LETTER
September 4, 2024      Olga Hinson  [address] [address]        To Whom It May Concern:    Olga Hinson  was seen on 9/4/24.  Please excuse her today until 9/6/24 due to illness.        Sincerely,        Celia Harding MD

## 2024-09-04 NOTE — PROGRESS NOTES
"Olga is a 23 year old who is being evaluated via a billable video visit.    How would you like to obtain your AVS? MyChart  If the video visit is dropped, the invitation should be resent by: Text to cell phone: [phone]  Will anyone else be joining your video visit? No      Assessment & Plan     Acute cough  This seems to be viral in etiology.  Patient is already feeling a bit better.  I would recommend Tessalon Perles for at night.  Otherwise she will contact me if there is anything else that she needs.  Encouraged her to get a COVID-19 test while she is at the pharmacy.  Encouraged her to wear a mask while she is out of the house.    Note for school and work provided.  - benzonatate (TESSALON) 100 MG capsule; Take 1 capsule (100 mg) by mouth 3 times daily as needed for cough.          BMI  Estimated body mass index is 29.03 kg/m  as calculated from the following:    Height as of 3/6/24: 1.71 m (5' 7.32\").    Weight as of 3/6/24: 84.9 kg (187 lb 2 oz).             Subjective   Olga is a 23 year old, presenting for the following health issues:  Cough (Sxs since Monday- has not tested for COVID- denies any other sxs ), Generalized Body Aches, and Fever    Cough    Fever  Associated symptoms include coughing and a fever.   History of Present Illness       Reason for visit:  A cough and body aches  Symptom onset:  1-3 days ago  Symptom intensity:  Moderate  Symptom progression:  Staying the same  Had these symptoms before:  Yes  Has tried/received treatment for these symptoms:  No   She is taking medications regularly.       Patient notes she was coughing a few days ago.  Has had some intermittent subjective fevers but does not have a thermometer.  No shortness of breath.  Actually feeling a bit better today and tried to go to class this morning but on her way began having some coughing fits so she drove home.  She then slept for a few more hours and is feeling a bit better now.    States that coughing does not " keep her up at night.    No history of asthma.            Review of Systems  Constitutional, HEENT, cardiovascular, pulmonary, GI, , musculoskeletal, neuro, skin, endocrine and psych systems are negative, except as otherwise noted.      Objective           Vitals:  No vitals were obtained today due to virtual visit.    Physical Exam   GENERAL: alert and no distress  EYES: Eyes grossly normal to inspection.  No discharge or erythema, or obvious scleral/conjunctival abnormalities.  RESP: No audible wheeze, cough, or visible cyanosis.    SKIN: Visible skin clear. No significant rash, abnormal pigmentation or lesions.  NEURO: Cranial nerves grossly intact.  Mentation and speech appropriate for age.  PSYCH: Appropriate affect, tone, and pace of words          Video-Visit Details    Type of service:  Video Visit   Originating Location (pt. Location): Home    Distant Location (provider location):  On-site  Platform used for Video Visit: Medardo  Signed Electronically by: Celia Harding MD

## 2024-09-06 ENCOUNTER — E-VISIT (OUTPATIENT)
Dept: FAMILY MEDICINE | Facility: CLINIC | Age: 24
End: 2024-09-06
Payer: COMMERCIAL

## 2024-09-06 DIAGNOSIS — R05.1 ACUTE COUGH: Primary | ICD-10-CM

## 2024-09-06 PROCEDURE — 99207 PR NON-BILLABLE SERV PER CHARTING: CPT | Performed by: FAMILY MEDICINE

## 2025-01-27 ENCOUNTER — TRANSFERRED RECORDS (OUTPATIENT)
Dept: HEALTH INFORMATION MANAGEMENT | Facility: CLINIC | Age: 25
End: 2025-01-27
Payer: COMMERCIAL

## 2025-02-06 ENCOUNTER — OFFICE VISIT (OUTPATIENT)
Dept: OBGYN | Facility: CLINIC | Age: 25
End: 2025-02-06
Attending: ADVANCED PRACTICE MIDWIFE
Payer: COMMERCIAL

## 2025-02-06 VITALS
DIASTOLIC BLOOD PRESSURE: 83 MMHG | BODY MASS INDEX: 27.47 KG/M2 | HEIGHT: 67 IN | SYSTOLIC BLOOD PRESSURE: 127 MMHG | WEIGHT: 175 LBS | HEART RATE: 87 BPM

## 2025-02-06 DIAGNOSIS — Z30.09 COUNSELING FOR BIRTH CONTROL, INTRAUTERINE DEVICE: Primary | ICD-10-CM

## 2025-02-06 PROCEDURE — 99213 OFFICE O/P EST LOW 20 MIN: CPT | Performed by: ADVANCED PRACTICE MIDWIFE

## 2025-02-06 RX ORDER — CREATINE MONOHYDRATE 100 %
5 POWDER (GRAM) MISCELLANEOUS DAILY
COMMUNITY

## 2025-02-06 RX ORDER — CLOBETASOL PROPIONATE 0.5 MG/ML
SOLUTION TOPICAL
COMMUNITY
Start: 2025-01-27

## 2025-02-06 RX ORDER — KETOCONAZOLE 20 MG/ML
SHAMPOO, SUSPENSION TOPICAL
COMMUNITY
Start: 2025-01-27

## 2025-02-06 ASSESSMENT — PAIN SCALES - GENERAL: PAINLEVEL_OUTOF10: NO PAIN (0)

## 2025-02-06 NOTE — PROGRESS NOTES
"Subjective:  Patient is a 24 year old  who presents to discuss birth control options.   Current Birth Control:  COCs.   Overall happy with method as it helps periods, however it is becoming increasingly more difficult for her to remember to take her pills daily.  Prior to OCPs, periods were heavier and crampier. Interested in IUD. Denies history of migraines, non smoker, no personal or family h/o DVT.     Is newly sexually active since the beginning of the year with 100% condom use and no missed pills. No H/O STI, PID, or known recent exposure.    Frequency of menses- q 28  Are periods regular?- yes  Change in menses with birth control, lighter, minimal cramps. Patient's last menstrual period was 2025 (approximate).    Objective:  Vitals:    25 1405   BP: 127/83   Pulse: 87   Weight: 79.4 kg (175 lb)   Height: 1.7 m (5' 6.93\")     Assessment/Plan:  Counseling on IUD    We discussed following options:         - IUD's: I went over the difference between the Paraguard and the Mirena/Leigh.   We focused on the different bleeding profiles, and I particularly stressed the slightly heavier periods with the Paraguard for the first year of use, and the irregular on and off bleeding for the first year of use that can be expected with the Mirena/Leigh. We discussed the amenorrhea rate of 70% with the Mirena after 1 year. We discussed that both methods are reversible immediately once removed.      - Reviewed risks, benefits, effectiveness, etc.    - Reviewed pain management options for procedure.   - Encouraged continued use of COCs and condoms until IUD insertion.  - Pt desires Mirena IUD, but does not desire insertion today.    - RTO for IUD insertion      I, Mary Hoffman, completed the PFSH and ROS. I then acted as a scribe for Unruly Melchor CNM, for the remainder of the visit.  - Mary Hoffman, NP DNP Student    I agree with the PFSH and ROS as completed by the NP Student, except for changes made by " me.  The remainder of the encounter was performed by me and scribed by the ERIKA Student.  The scribed note accurately reflects my personal services and decisions made by me.  - RICHIE Ratliff CNM

## 2025-02-06 NOTE — LETTER
"2025       RE: Olga Hinson  [address] [address]     Dear Colleague,    Thank you for referring your patient, Olga Hinson, to the [facility] [place] at [facility]. Please see a copy of my visit note below.    Subjective:  Patient is a 24 year old  who presents to discuss birth control options.   Current Birth Control:  COCs.   Overall happy with method as it helps periods, however it is becoming increasingly more difficult for her to remember to take her pills daily.  Prior to OCPs, periods were heavier and crampier. Interested in IUD. Denies history of migraines, non smoker, no personal or family h/o DVT.     Is newly sexually active since the beginning of the year with 100% condom use and no missed pills. No H/O STI, PID, or known recent exposure.    Frequency of menses- q 28  Are periods regular?- yes  Change in menses with birth control, lighter, minimal cramps. Patient's last menstrual period was 2025 (approximate).    Objective:  Vitals:    25 1405   BP: 127/83   Pulse: 87   Weight: 79.4 kg (175 lb)   Height: 1.7 m (5' 6.93\")     Assessment/Plan:  Counseling on IUD    We discussed following options:         - IUD's: I went over the difference between the Paraguard and the Mirena/Leigh.   We focused on the different bleeding profiles, and I particularly stressed the slightly heavier periods with the Paraguard for the first year of use, and the irregular on and off bleeding for the first year of use that can be expected with the Mirena/Leigh. We discussed the amenorrhea rate of 70% with the Mirena after 1 year. We discussed that both methods are reversible immediately once removed.      - Reviewed risks, benefits, effectiveness, etc.   - Encouraged continued use of COCs and condoms until IUD insertion.  - Pt desires Mirena IUD, but does not desire insertion today.    - RTO for IUD " insertion      I, Mary Hoffman, completed the PFSH and ROS. I then acted as a scribe for Unruly Melchor CNM, for the remainder of the visit.  - Mary Hoffman, NP DNP Student    I agree with the PFSH and ROS as completed by the ERIKA Student, except for changes made by me.  The remainder of the encounter was performed by me and scribed by the ERIKA Student.  The scribed note accurately reflects my personal services and decisions made by me.  - RICHIE Ratliff CNM      Again, thank you for allowing me to participate in the care of your patient.      Sincerely,    RICHIE Ratliff CNM

## 2025-02-10 DIAGNOSIS — Z30.430 ENCOUNTER FOR IUD INSERTION: Primary | ICD-10-CM

## 2025-02-10 RX ORDER — DIAZEPAM 5 MG/1
5 TABLET ORAL
Qty: 1 TABLET | Refills: 0 | Status: SHIPPED | OUTPATIENT
Start: 2025-02-10

## 2025-02-12 DIAGNOSIS — F41.9 ANXIETY: ICD-10-CM

## 2025-02-19 ENCOUNTER — OFFICE VISIT (OUTPATIENT)
Dept: OBGYN | Facility: CLINIC | Age: 25
End: 2025-02-19
Attending: REGISTERED NURSE
Payer: COMMERCIAL

## 2025-02-19 VITALS
DIASTOLIC BLOOD PRESSURE: 90 MMHG | SYSTOLIC BLOOD PRESSURE: 128 MMHG | HEIGHT: 67 IN | HEART RATE: 92 BPM | BODY MASS INDEX: 27.47 KG/M2

## 2025-02-19 DIAGNOSIS — Z30.430 ENCOUNTER FOR INSERTION OF INTRAUTERINE CONTRACEPTIVE DEVICE: Primary | ICD-10-CM

## 2025-02-19 LAB
HCG UR QL: NEGATIVE
INTERNAL QC OK POCT: NORMAL
POCT KIT EXPIRATION DATE: NORMAL
POCT KIT LOT NUMBER: NORMAL

## 2025-02-19 PROCEDURE — 250N000011 HC RX IP 250 OP 636: Performed by: REGISTERED NURSE

## 2025-02-19 PROCEDURE — 81025 URINE PREGNANCY TEST: CPT | Performed by: REGISTERED NURSE

## 2025-02-19 PROCEDURE — 58300 INSERT INTRAUTERINE DEVICE: CPT | Performed by: REGISTERED NURSE

## 2025-02-19 PROCEDURE — 87591 N.GONORRHOEAE DNA AMP PROB: CPT | Performed by: REGISTERED NURSE

## 2025-02-19 RX ADMIN — LEVONORGESTREL 1 EACH: 52 INTRAUTERINE DEVICE INTRAUTERINE at 16:46

## 2025-02-19 NOTE — PROGRESS NOTES
"  Subjective:  Patient is a 24 year old  who presents for IUD insertion.   Current Birth Control:  ROSEY.    Was happy on it and able to take as directed.     No h/x migraine, non smoker, no personal or family h/o DVT. No Abdominal pain, chest pain, headaches, eye problems.    Is currently sexually active with one partner.   Last sexually active 26 with condom use.   No Pain with sex.  No burning with penetration. No postcoital bleeding.   No H/O STI, PID, or known recent exposure.       Menarche- 16     Frequency of menses- q 4 days    Are periods regular?- yes 28 day   Heaviness of bleeding- yes   Pain with Periods?  yes   Breast Tenderness with Periods? no    PMS Symptoms and severity- no    Change in menses with birth control improved bleeding pain. Patient's last menstrual period was 2025 (approximate).      Objective:  Vitals:    25 1529   BP: 128/90   Pulse: 92   Height: 1.7 m (5' 6.93\")       IUD Insertion:  CONSULT:    Is a pregnancy test required: Yes.  Was it positive or negative?  Negative  Was a consent obtained?  Yes    Subjective: Olga Hinson is a 24 year old  presents for IUD and desires Mirena type IUD.    Patient has been given the opportunity to ask questions about all forms of birth control, including all options appropriate for Olga Hinson. Discussed that no method of birth control, except abstinence is 100% effective against pregnancy or sexually transmitted infection.     Olga Hinson understands she may have the IUD removed at any time. IUD should be removed by a health care provider.    The entire insertion procedure was reviewed with the patient, including care after placement.    Patient's last menstrual period was 2025 (approximate). Has current contraception. No allergy to betadine or shellfish. Patient desires STD screening  HCG Qual Urine   Date Value Ref Range Status   2025 Negative Negative Final         /90   Pulse 92   Ht 1.7 " "yasmin (5' 6.93\")   LMP 02/11/2025 (Approximate)   BMI 27.47 kg/m      Pelvic Exam:   EG/BUS: normal genital architecture without lesions, erythema or abnormal secretions.   Vagina: moist, pink, rugae with physiologic discharge and secretions  Cervix: Nulliparous no lesions and pink, moist, closed, without lesion       PROCEDURE NOTE: -- IUD Insertion    Reason for Insertion: contraception    Premedicated with ibuprofen. Paracervical block performed. Ativan  Under sterile technique, cervix was visualized with speculum and prepped with Chlorhexidine solution swab x 3. Tenaculum was placed for stability. The uterus was gently straightened and sounded to 9.0 cm. IUD prepared for placement, and IUD inserted according to 's instructions without difficulty or significant resitance, and deployed at the fundus. The strings were visualized and trimmed to 3.0 cm from the external os. Tenaculum was removed and hemostasis noted. Speculum removed.  Patient tolerated procedure well.    EBL: minimal    Complications: none    ASSESSMENT:     ICD-10-CM    1. Encounter for insertion of intrauterine contraceptive device  Z30.430 hCG qual urine POCT     Chlamydia trachomatis/Neisseria gonorrhoeae by PCR     levonorgestrel (MIRENA) 52 MG (20 mcg/day) IUD 1 each     INSERTION INTRAUTERINE DEVICE           PLAN:    Given 's handouts, including when to have IUD removed, list of danger s/sx, side effects and follow up recommended. Encouraged condom use for prevention of STD. Back up contraception advised for 7 days if progestin method. Advised to call for any fever, for prolonged or severe pain or bleeding, abnormal vaginal discharge, or unable to palpate strings. She was advised to use pain medications (ibuprofen) as needed for mild to moderate pain. Advised to follow-up in clinic in 4-6 weeks for IUD string check .    Tanya Richey, RICHIE Cavazos, BIJUN RN Student Nurse Midwife    Melissa NAYAK " Dirk, am serving as a scribe; to document services personally performed by  A Rossi CNM based on data collection and the provider's statements to me.   Melissa Cavazos

## 2025-02-19 NOTE — LETTER
"2025       RE: Olga Hinson  [address] [address]     Dear Colleague,    Thank you for referring your patient, Olga Hinson, to the [facility] [place] at [facility]. Please see a copy of my visit note below.      Subjective:  Patient is a 24 year old  who presents for IUD insertion.   Current Birth Control:  ROSEY.    Was happy on it and able to take as directed.     No h/x migraine, non smoker, no personal or family h/o DVT. No Abdominal pain, chest pain, headaches, eye problems.    Is currently sexually active with one partner.   Last sexually active 25 with condom use.   No Pain with sex.  No burning with penetration. No postcoital bleeding.   No H/O STI, PID, or known recent exposure.       Menarche- 16     Frequency of menses- q 4 days    Are periods regular?- yes 28 day   Heaviness of bleeding- yes   Pain with Periods?  yes   Breast Tenderness with Periods? no    PMS Symptoms and severity- no    Change in menses with birth control improved bleeding pain. Patient's last menstrual period was 2025 (approximate).      Objective:  Vitals:    25 1529   BP: 128/90   Pulse: 92   Height: 1.7 m (5' 6.93\")       IUD Insertion:  CONSULT:    Is a pregnancy test required: Yes.  Was it positive or negative?  Negative  Was a consent obtained?  Yes    Subjective: Olga Hinson is a 24 year old  presents for IUD and desires Mirena type IUD.    Patient has been given the opportunity to ask questions about all forms of birth control, including all options appropriate for Olga Hinson. Discussed that no method of birth control, except abstinence is 100% effective against pregnancy or sexually transmitted infection.     Olga Hinson understands she may have the IUD removed at any time. IUD should be removed by a health care provider.    The entire insertion procedure was reviewed with the patient, " "including care after placement.    Patient's last menstrual period was 02/11/2025 (approximate). Has current contraception. No allergy to betadine or shellfish. Patient desires STD screening  HCG Qual Urine   Date Value Ref Range Status   02/19/2025 Negative Negative Final         /90   Pulse 92   Ht 1.7 m (5' 6.93\")   LMP 02/11/2025 (Approximate)   BMI 27.47 kg/m      Pelvic Exam:   EG/BUS: normal genital architecture without lesions, erythema or abnormal secretions.   Vagina: moist, pink, rugae with physiologic discharge and secretions  Cervix: Nulliparous no lesions and pink, moist, closed, without lesion       PROCEDURE NOTE: -- IUD Insertion    Reason for Insertion: contraception    Premedicated with ibuprofen. Paracervical block performed. Ativan  Under sterile technique, cervix was visualized with speculum and prepped with Chlorhexidine solution swab x 3. Tenaculum was placed for stability. The uterus was gently straightened and sounded to 9.0 cm. IUD prepared for placement, and IUD inserted according to 's instructions without difficulty or significant resitance, and deployed at the fundus. The strings were visualized and trimmed to 3.0 cm from the external os. Tenaculum was removed and hemostasis noted. Speculum removed.  Patient tolerated procedure well.    EBL: minimal    Complications: none    ASSESSMENT:     ICD-10-CM    1. Encounter for insertion of intrauterine contraceptive device  Z30.430 hCG qual urine POCT     Chlamydia trachomatis/Neisseria gonorrhoeae by PCR     levonorgestrel (MIRENA) 52 MG (20 mcg/day) IUD 1 each     INSERTION INTRAUTERINE DEVICE           PLAN:    Given 's handouts, including when to have IUD removed, list of danger s/sx, side effects and follow up recommended. Encouraged condom use for prevention of STD. Back up contraception advised for 7 days if progestin method. Advised to call for any fever, for prolonged or severe pain or bleeding, " abnormal vaginal discharge, or unable to palpate strings. She was advised to use pain medications (ibuprofen) as needed for mild to moderate pain. Advised to follow-up in clinic in 4-6 weeks for IUD string check .    RICHIE Nagy CNM, BSN RN Student Nurse Midwife    I, Melissa Cavazos, am serving as a scribe; to document services personally performed by  A Rossi STEINER based on data collection and the provider's statements to me.   Melissa Cavazos      Again, thank you for allowing me to participate in the care of your patient.      Sincerely,    RICHIE Nagy CNM

## 2025-02-19 NOTE — PATIENT INSTRUCTIONS
If you need to contact us for questions about:  Symptoms, Scheduling & Medical Questions; Non-urgent (2-3 day response) Monaeo message, Urgent (needing response today) [phone] (if after 3:30pm next day response)   Prescriptions: Please call your Pharmacy   Billing: [facility] [phone] or [facility]:[phone]

## 2025-02-20 LAB
C TRACH DNA SPEC QL PROBE+SIG AMP: NEGATIVE
N GONORRHOEA DNA SPEC QL NAA+PROBE: NEGATIVE
SPECIMEN TYPE: NORMAL

## 2025-02-20 NOTE — NURSING NOTE
Clinic Administered Medication Documentation      Intrauterine/Implant Insertion Documentation    Device was placed by provider (please see MAR for given by information). Please see MAR and medication order for additional information.     Type: Mirena  Remove/Replace by: Bertin Cornejo CNM    Expiration Date:  3-27

## 2025-02-26 SDOH — HEALTH STABILITY: PHYSICAL HEALTH: ON AVERAGE, HOW MANY DAYS PER WEEK DO YOU ENGAGE IN MODERATE TO STRENUOUS EXERCISE (LIKE A BRISK WALK)?: 7 DAYS

## 2025-02-26 SDOH — HEALTH STABILITY: PHYSICAL HEALTH: ON AVERAGE, HOW MANY MINUTES DO YOU ENGAGE IN EXERCISE AT THIS LEVEL?: 70 MIN

## 2025-02-26 ASSESSMENT — SOCIAL DETERMINANTS OF HEALTH (SDOH): HOW OFTEN DO YOU GET TOGETHER WITH FRIENDS OR RELATIVES?: ONCE A WEEK

## 2025-03-03 ENCOUNTER — OFFICE VISIT (OUTPATIENT)
Dept: FAMILY MEDICINE | Facility: CLINIC | Age: 25
End: 2025-03-03
Payer: COMMERCIAL

## 2025-03-03 VITALS
HEIGHT: 67 IN | OXYGEN SATURATION: 100 % | WEIGHT: 174.5 LBS | DIASTOLIC BLOOD PRESSURE: 70 MMHG | SYSTOLIC BLOOD PRESSURE: 118 MMHG | HEART RATE: 85 BPM | TEMPERATURE: 97.3 F | RESPIRATION RATE: 16 BRPM | BODY MASS INDEX: 27.39 KG/M2

## 2025-03-03 DIAGNOSIS — F41.9 ANXIETY: ICD-10-CM

## 2025-03-03 DIAGNOSIS — Z00.00 ROUTINE GENERAL MEDICAL EXAMINATION AT A HEALTH CARE FACILITY: Primary | ICD-10-CM

## 2025-03-03 PROCEDURE — 90715 TDAP VACCINE 7 YRS/> IM: CPT | Performed by: FAMILY MEDICINE

## 2025-03-03 PROCEDURE — 3074F SYST BP LT 130 MM HG: CPT | Performed by: FAMILY MEDICINE

## 2025-03-03 PROCEDURE — 99395 PREV VISIT EST AGE 18-39: CPT | Mod: 25 | Performed by: FAMILY MEDICINE

## 2025-03-03 PROCEDURE — 3078F DIAST BP <80 MM HG: CPT | Performed by: FAMILY MEDICINE

## 2025-03-03 PROCEDURE — 90471 IMMUNIZATION ADMIN: CPT | Performed by: FAMILY MEDICINE

## 2025-03-03 RX ORDER — PENICILLIN V POTASSIUM 500 MG/1
TABLET, FILM COATED ORAL
COMMUNITY
Start: 2025-02-24

## 2025-03-03 NOTE — PROGRESS NOTES
"Preventive Care Visit  [facility] NATHEN Harding MD, Family Medicine  Mar 3, 2025      Assessment & Plan     Routine general medical examination at a health care facility    Anxiety  Overall doing very well.  Does not need a refill of her Prozac today.    Patient has been advised of split billing requirements and indicates understanding: Yes        BMI  Estimated body mass index is 27.13 kg/m  as calculated from the following:    Height as of this encounter: 1.708 m (5' 7.25\").    Weight as of this encounter: 79.2 kg (174 lb 8 oz).   Weight management plan: Discussed healthy diet and exercise guidelines    Counseling  Appropriate preventive services were addressed with this patient via screening, questionnaire, or discussion as appropriate for fall prevention, nutrition, physical activity, Tobacco-use cessation, social engagement, weight loss and cognition.  Checklist reviewing preventive services available has been given to the patient.  Reviewed patient's diet, addressing concerns and/or questions.           Subjective   Olga is a 24 year old, presenting for the following:  Physical (Not fasting for labs today)         HPI  Olga is a very pleasant 24-year-old female who presents to the clinic today for a complete physical examination.    She is overall doing well.  Recently had an IUD placed and is doing well with this thus far.    She does not have any specific questions or concerns.  Will be going to [employer] next week and is excited about that.  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        Advance Care Planning  Patient does not have a Health Care Directive: Discussed advance care planning with patient; however, patient declined at this time.      2/26/2025   General Health   How would you rate your overall physical health? Excellent   Feel stress (tense, anxious, or unable to sleep) Not at all         2/26/2025   Nutrition "   Three or more servings of calcium each day? Yes   Diet: Regular (no restrictions)   How many servings of fruit and vegetables per day? (!) 2-3   How many sweetened beverages each day? 0-1         2/26/2025   Exercise   Days per week of moderate/strenous exercise 7 days   Average minutes spent exercising at this level 70 min         2/26/2025   Social Factors   Frequency of gathering with friends or relatives Once a week   Worry food won't last until get money to buy more No   Food not last or not have enough money for food? No   Do you have housing? (Housing is defined as stable permanent housing and does not include staying ouside in a car, in a tent, in an abandoned building, in an overnight shelter, or couch-surfing.) Yes   Are you worried about losing your housing? No   Lack of transportation? No   Unable to get utilities (heat,electricity)? No         2/26/2025   Dental   Dentist two times every year? Yes         2/28/2024   TB Screening   Were you born outside of the [place]? No         Today's PHQ-2 Score:       3/3/2025     9:10 AM   PHQ-2 ( 1999 [employer])   Q1: Little interest or pleasure in doing things 0   Q2: Feeling down, depressed or hopeless 0   PHQ-2 Score 0    Q1: Little interest or pleasure in doing things Not at all   Q2: Feeling down, depressed or hopeless Not at all   PHQ-2 Score 0       Patient-reported           2/26/2025   Substance Use   Alcohol more than 3/day or more than 7/wk No   Do you use any other substances recreationally? No     Social History     Tobacco Use    Smoking status: Never    Smokeless tobacco: Never   Vaping Use    Vaping status: Never Used   Substance Use Topics    Alcohol use: Yes     Alcohol/week: 2.0 standard drinks of alcohol     Types: 2 Standard drinks or equivalent per week     Comment: social    Drug use: Never             2/26/2025   One time HIV Screening   Previous HIV test? No         2/26/2025   STI Screening   New sexual partner(s) since last STI/HIV test? No  "    History of abnormal Pap smear: No - age 21-29 PAP every 3 years recommended        2/24/2023     1:31 PM   PAP / HPV   PAP Negative for Intraepithelial Lesion or Malignancy (NILM)            2/26/2025   Contraception/Family Planning   Questions about contraception or family planning No        Reviewed and updated as needed this visit by Provider                    Past Medical History:   Diagnosis Date    Anxiety     Closed Fracture Of The Distal End Of The Radius     Created by Conversion      Past Surgical History:   Procedure Laterality Date    WISDOM TOOTH EXTRACTION           Review of Systems  Constitutional, HEENT, cardiovascular, pulmonary, GI, , musculoskeletal, neuro, skin, endocrine and psych systems are negative, except as otherwise noted.     Objective    Exam  /70   Pulse 85   Temp 97.3  F (36.3  C) (Tympanic)   Resp 16   Ht 1.708 m (5' 7.25\")   Wt 79.2 kg (174 lb 8 oz)   LMP 02/11/2025 (Approximate)   SpO2 100%   BMI 27.13 kg/m     Estimated body mass index is 27.13 kg/m  as calculated from the following:    Height as of this encounter: 1.708 m (5' 7.25\").    Weight as of this encounter: 79.2 kg (174 lb 8 oz).    Physical Exam    Physical Exam:  General Appearance: Alert, cooperative, no distress, appears stated age   Head: Normocephalic, without obvious abnormality, atraumatic  Eyes: PERRL, conjunctiva/corneas clear, EOM's intact   Ears: Normal TM's and external ear canals, both ears  Nose:Nares normal, septum midline,mucosa normal, no drainage    Throat:Lips, mucosa, and tongue normal; teeth and gums normal  Neck: Supple, symmetrical, trachea midline, no adenopathy;  thyroid: not enlarged, symmetric, no tenderness/mass/nodules  Back: Symmetric, no curvature, ROM normal,  Lungs: Clear to auscultation bilaterally, respirations unlabored  Breasts: No breast masses, tenderness, asymmetry, or nipple discharge.  Heart: Regular rate and rhythm, S1 and S2 normal, no murmur, rub, or " gallop  Abdomen: Soft, non-tender, bowel sounds active all four quadrants,  no masses, no organomegaly  Extremities: Extremities normal, atraumatic, no cyanosis or edema  Skin: Skin color, texture, turgor normal, no rashes or lesions  Lymph nodes: Cervical, supraclavicular, and axillary nodes normal and   Neurologic: Normal          Signed Electronically by: Celia Harding MD

## 2025-03-03 NOTE — PATIENT INSTRUCTIONS
Patient Education   Preventive Care Advice   This is general advice given by our system to help you stay healthy. However, your care team may have specific advice just for you. Please talk to your care team about your preventive care needs.  Nutrition  Eat 5 or more servings of fruits and vegetables each day.  Try wheat bread, brown rice and whole grain pasta (instead of white bread, rice, and pasta).  Get enough calcium and vitamin D. Check the label on foods and aim for 100% of the RDA (recommended daily allowance).  Lifestyle  Exercise at least 150 minutes each week  (30 minutes a day, 5 days a week).  Do muscle strengthening activities 2 days a week. These help control your weight and prevent disease.  No smoking.  Wear sunscreen to prevent skin cancer.  Have a dental exam and cleaning every 6 months.  Yearly exams  See your health care team every year to talk about:  Any changes in your health.  Any medicines your care team has prescribed.  Preventive care, family planning, and ways to prevent chronic diseases.  Shots (vaccines)   HPV shots (up to age 26), if you've never had them before.  Hepatitis B shots (up to age 59), if you've never had them before.  COVID-19 shot: Get this shot when it's due.  Flu shot: Get a flu shot every year.  Tetanus shot: Get a tetanus shot every 10 years.  Pneumococcal, hepatitis A, and RSV shots: Ask your care team if you need these based on your risk.  Shingles shot (for age 50 and up)  General health tests  Diabetes screening:  Starting at age 35, Get screened for diabetes at least every 3 years.  If you are younger than age 35, ask your care team if you should be screened for diabetes.  Cholesterol test: At age 39, start having a cholesterol test every 5 years, or more often if advised.  Bone density scan (DEXA): At age 50, ask your care team if you should have this scan for osteoporosis (brittle bones).  Hepatitis C: Get tested at least once in your life.  STIs (sexually  transmitted infections)  Before age 24: Ask your care team if you should be screened for STIs.  After age 24: Get screened for STIs if you're at risk. You are at risk for STIs (including HIV) if:  You are sexually active with more than one person.  You don't use condoms every time.  You or a partner was diagnosed with a sexually transmitted infection.  If you are at risk for HIV, ask about PrEP medicine to prevent HIV.  Get tested for HIV at least once in your life, whether you are at risk for HIV or not.  Cancer screening tests  Cervical cancer screening: If you have a cervix, begin getting regular cervical cancer screening tests starting at age 21.  Breast cancer scan (mammogram): If you've ever had breasts, begin having regular mammograms starting at age 40. This is a scan to check for breast cancer.  Colon cancer screening: It is important to start screening for colon cancer at age 45.  Have a colonoscopy test every 10 years (or more often if you're at risk) Or, ask your provider about stool tests like a FIT test every year or Cologuard test every 3 years.  To learn more about your testing options, visit:   .  For help making a decision, visit:   https://bit.ly/pn69212.  Prostate cancer screening test: If you have a prostate, ask your care team if a prostate cancer screening test (PSA) at age 55 is right for you.  Lung cancer screening: If you are a current or former smoker ages 50 to 80, ask your care team if ongoing lung cancer screenings are right for you.  For informational purposes only. Not to replace the advice of your health care provider. Copyright   2023 [place] [employer]. All rights reserved. Clinically reviewed by the [facility]. [employer] [id number] - REV 01/24.

## 2025-03-20 ENCOUNTER — OFFICE VISIT (OUTPATIENT)
Dept: OBGYN | Facility: CLINIC | Age: 25
End: 2025-03-20
Attending: REGISTERED NURSE
Payer: COMMERCIAL

## 2025-03-20 VITALS
DIASTOLIC BLOOD PRESSURE: 83 MMHG | SYSTOLIC BLOOD PRESSURE: 125 MMHG | WEIGHT: 174.9 LBS | BODY MASS INDEX: 27.19 KG/M2 | HEART RATE: 74 BPM

## 2025-03-20 DIAGNOSIS — Z30.431 IUD CHECK UP: Primary | ICD-10-CM

## 2025-03-20 PROCEDURE — 99213 OFFICE O/P EST LOW 20 MIN: CPT | Performed by: REGISTERED NURSE

## 2025-03-20 RX ORDER — CEPHALEXIN 500 MG/1
CAPSULE ORAL
COMMUNITY
Start: 2025-03-14

## 2025-03-20 NOTE — PROGRESS NOTES
Chief Complaint: IUD check  Subjective: 24 year old  presents for IUD check . IUD was placed on 25 with strings trimmed to 3.0 cm. IUD has not been verified in place previously. Pt reports being able to locate strings. Pt has had sexual activity since placement, no pain.  Patient's last menstrual period was 2025 (approximate).  .  O:   Vitals:    25 1551   BP: 125/83   Pulse: 74   Weight: 79.3 kg (174 lb 14.4 oz)     -VS: reviewed and within normal limits   -General appearance: no acute distress, patient is comfortable     NEUROLOGICAL/PSYCHIATRIC   - Orientated x3,   -Mood and affect: : normal     PELVIC:   Normal external female genitalia without lesions. Adequate perineal body, normal urethral meatus.   Vagina moist and well rugated without lesions or discharge. Blood tinged discharged c/w menses  Cervix pink, without lesions, discharge or tenderness. IUD strings visualized at 11 o'clock.     Assessment/Plan   - Mirena IUD check, in place   - Patient verbalized technique to locate IUD strings. Advised patient may feel on their own or guide a partner.    - Reviewed how/why to contact provider including increased abdominal cramping, vaginal bleeding, abdominal pain, fever or flu like symptoms or if unable to locate strings on monthly check.   - RTO prn if questions or concerns    I, Bhavani Mahmood, am serving as a scribe; to document services personally performed by Tanya QUIROZ CNM based on data collection and the provider's statements to me.     Bhavani Mahmood RN, SNM    I agree with the PFSH and ROS as completed by Bhavani Mahmood except for changes made by me. The remainder of the encounter was performed by me and scribed by Bhavani Mahmood. The scribed note accurately reflects my personal services and decisions made by me.   RICHIE Nagy CNM

## 2025-03-20 NOTE — LETTER
3/20/2025       RE: Olga Hinson  [address] [address]     Dear Colleague,    Thank you for referring your patient, Olga Hinson, to the [facility] [place] at [facility]. Please see a copy of my visit note below.    Chief Complaint: IUD check  Subjective: 24 year old  presents for IUD check . IUD was placed on 25 with strings trimmed to 3.0 cm. IUD has not been verified in place previously. Pt reports being able to locate strings. Pt has had sexual activity since placement.  Patient's last menstrual period was 2025 (approximate).  .  O:   Vitals:    25 1551   BP: 125/83   Pulse: 74   Weight: 79.3 kg (174 lb 14.4 oz)     -VS: reviewed and within normal limits   -General appearance: no acute distress, patient is comfortable     NEUROLOGICAL/PSYCHIATRIC   - Orientated x3,   -Mood and affect: : normal     PELVIC:   Normal external female genitalia without lesions. Adequate perineal body, normal urethral meatus.   Vagina moist and well rugated without lesions or discharge. Blood tinged discharged c/w menses  Cervix pink, without lesions, discharge or tenderness. IUD strings visualized at 11 o'clock.     Assessment/Plan   - Mirena IUD check, in place   - Patient verbalized technique to locate IUD strings. Advised patient may feel on their own or guide a partner.    - Reviewed how/why to contact provider including increased abdominal cramping, vaginal bleeding, abdominal pain, fever or flu like symptoms or if unable to locate strings on monthly check.   - RTO prn if questions or concerns    I, Bhavani Mahmood, am serving as a scribe; to document services personally performed by Tanya QUIROZ CNM based on data collection and the provider's statements to me.     Bhavani Mahmood RN, SNM    I agree with the PFSH and ROS as completed by Bhavani Mahmood except for changes made by me. The remainder of the encounter was  performed by me and scribed by Bhavani Mahmood. The scribed note accurately reflects my personal services and decisions made by me.   RICHIE Nagy CNM      Again, thank you for allowing me to participate in the care of your patient.      Sincerely,    RICHIE Nagy CNM

## 2025-03-20 NOTE — PATIENT INSTRUCTIONS
Thank you for trusting us with your care!   Please be aware, if you are on Mychart, you may see your results prior to your providers review. If labs are abnormal, we will call or message you on 1Ringt with a follow up plan.    If you need to contact us for questions about:  Symptoms, Scheduling & Medical Questions; Non-urgent (2-3 day response) Quench message, Urgent (needing response today) [phone] (if after 3:30pm next day response)   Prescriptions: Please call your Pharmacy   Billing: [place] [phone] or [facility]:[phone]

## 2025-05-13 DIAGNOSIS — F41.9 ANXIETY: ICD-10-CM

## 2025-05-14 NOTE — TELEPHONE ENCOUNTER
Requested Prescriptions   Pending Prescriptions Disp Refills    FLUoxetine (PROZAC) 20 MG capsule [Pharmacy Med Name: FLUOXETINE HCL CAPS 20MG] 90 capsule 3     Sig: TAKE 1 CAPSULE DAILY       SSRIs Protocol Failed - 5/14/2025 12:17 PM        Failed - ANTOINETTE-7 score of less than 5 in past 6 months.     Please review last ANTOINETTE-7 score.       4/24/2022     7:17 PM   ANTOINETTE-7 SCORE   Total Score 7 (mild anxiety)   Total Score 7             Passed - Medication is active on med list and the sig matches. RN to manually verify dose and sig if red X/fail.     If the protocol passes (green check), you do not need to verify med dose and sig.    A prescription matches if they are the same clinical intention.    For Example: once daily and every morning are the same.    The protocol can not identify upper and lower case letters as matching and will fail.     For Example: Take 1 tablet (50 mg) by mouth daily     TAKE 1 TABLET (50 MG) BY MOUTH DAILY    For all fails (red x), verify dose and sig.    If the refill does match what is on file, the RN can still proceed to approve the refill request.       If they do not match, route to the appropriate provider.             Passed - Recent (12 mo) or future (90 days) visit within the authorizing provider's specialty     The patient must have completed an in-person or virtual visit within the past 12 months or has a future visit scheduled within the next 90 days with the authorizing provider s specialty.  Urgent care and e-visits do not qualify as an office visit for this protocol.          Passed - Medication indicated for associated diagnosis     Medication is associated with one or more of the following diagnoses:              Anxiety             Bipolar  Depression  Obsessive-compulsive disorder             Panic disorder  Postmenopausal flushing             Premenstrual dysphoric disorder             Social phobia   Adjustment disorder with depressed mood   Mood disorder   Adjustment  disorder with anxious mood          Passed - Patient is age 18 or older        Passed - No active pregnancy on record        Passed - No positive pregnancy test in last 12 months          ANTOINETTE-7/PHQ-9 questionnaires sent to patient to complete via [employer].     Julie Behrendt RN

## 2025-07-30 ENCOUNTER — OFFICE VISIT (OUTPATIENT)
Dept: OBGYN | Facility: CLINIC | Age: 25
End: 2025-07-30
Payer: COMMERCIAL

## 2025-07-30 VITALS
HEART RATE: 66 BPM | BODY MASS INDEX: 27.17 KG/M2 | SYSTOLIC BLOOD PRESSURE: 129 MMHG | DIASTOLIC BLOOD PRESSURE: 81 MMHG | HEIGHT: 67 IN | WEIGHT: 173.1 LBS

## 2025-07-30 DIAGNOSIS — Z97.5 IUD (INTRAUTERINE DEVICE) IN PLACE: ICD-10-CM

## 2025-07-30 DIAGNOSIS — R10.2 PELVIC PAIN: Primary | ICD-10-CM

## 2025-07-30 LAB
ALBUMIN UR-MCNC: NEGATIVE MG/DL
APPEARANCE UR: CLEAR
BACTERIA #/AREA URNS HPF: ABNORMAL /HPF
BACTERIAL VAGINOSIS VAG-IMP: POSITIVE
BILIRUB UR QL STRIP: NEGATIVE
CANDIDA DNA VAG QL NAA+PROBE: DETECTED
CANDIDA GLABRATA / CANDIDA KRUSEI DNA: NOT DETECTED
COLOR UR AUTO: ABNORMAL
GLUCOSE UR STRIP-MCNC: NEGATIVE MG/DL
HGB UR QL STRIP: NEGATIVE
KETONES UR STRIP-MCNC: NEGATIVE MG/DL
LEUKOCYTE ESTERASE UR QL STRIP: NEGATIVE
NITRATE UR QL: NEGATIVE
PH UR STRIP: 6.5 [PH] (ref 5–7)
RBC URINE: <1 /HPF
SP GR UR STRIP: 1 (ref 1–1.03)
SQUAMOUS EPITHELIAL: <1 /HPF
T VAGINALIS DNA VAG QL NAA+PROBE: NOT DETECTED
UROBILINOGEN UR STRIP-MCNC: NORMAL MG/DL
WBC URINE: <1 /HPF

## 2025-07-30 PROCEDURE — 87591 N.GONORRHOEAE DNA AMP PROB: CPT

## 2025-07-30 PROCEDURE — 81001 URINALYSIS AUTO W/SCOPE: CPT

## 2025-07-30 PROCEDURE — 3074F SYST BP LT 130 MM HG: CPT

## 2025-07-30 PROCEDURE — 81515 NFCT DS BV&VAGINITIS DNA ALG: CPT

## 2025-07-30 PROCEDURE — 87491 CHLMYD TRACH DNA AMP PROBE: CPT

## 2025-07-30 PROCEDURE — 99213 OFFICE O/P EST LOW 20 MIN: CPT

## 2025-07-30 PROCEDURE — 3079F DIAST BP 80-89 MM HG: CPT

## 2025-07-30 PROCEDURE — 99214 OFFICE O/P EST MOD 30 MIN: CPT

## 2025-07-30 NOTE — LETTER
"2025       RE: Olga Hinson  [address] [address]     Dear Colleague,    Thank you for referring your patient, Olga Hinson, to the [facility] [place] at [facility]. Please see a copy of my visit note below.    Office Problem Visit    Subjective:    Olga is a 24 year old  who presents for pelvic pain since IUD was placed in February of this year.   Reports that the pain most occurs with certain positional changes and especially when she exercises. The pain feels sharp in her lower abdomen. Has tried ibuprofen which has been helpful.   Denies vaginal itching, burning, abnormal discharge. Denies pain with intercourse.     Review of Systems:  Also a 12 point comprehensive review of systems was negative except as noted.     Medical, Surgical, Social and Family history reviewed.     Objective:   Vitals:    Vitals:    25 1331   BP: 129/81   Pulse: 66   Weight: 78.5 kg (173 lb 1.6 oz)   Height: 1.708 m (5' 7.25\")     Physical Exam:  General: Pleasant, articulate, well-groomed, well-nourished female.  Not in any apparent distress.  Abdomen: Soft, nontender, no masses, negative CVAT.  External genitalia: Normal hair distribution, no lesions.  Urethral opening: Without lesions, or tenderness.   Vagina: Pink, rugated, normal-appearing discharge.  Cervix: pink, smooth, no lesions.  IUD strings visualized. IUD itself is not visualized.   Bimanual: Finds uterus small, mobile, nontender, no masses.  Negative CMT.  Adnexa, without masses or tenderness.      Assessment/Plan:  ASSESSMENT / PLAN:  (R10.2) Pelvic pain  (primary encounter diagnosis)  Plan: UA with Microscopic reflex to Culture, US         Pelvic Complete with Transvaginal, Multiplex         Vaginal Panel by PCR, Chlamydia trachomatis         PCR, Neisseria gonorrhoeae PCR    (Z97.5) IUD (intrauterine device) in place  Plan: US Pelvic Complete with " Transvaginal    - Discussed likely normal etiology related to IUD, and that more time might be needed to adjust. Discussed ibuprofen for pain consistently.   - Plan follow up after results.       Again, thank you for allowing me to participate in the care of your patient.      Sincerely,    RICHIE GoelM

## 2025-07-30 NOTE — PROGRESS NOTES
"Office Problem Visit    Subjective:    Olga is a 24 year old  who presents for pelvic pain since IUD was placed in February of this year.   Reports that the pain most occurs with certain positional changes and especially when she exercises. The pain feels sharp in her lower abdomen. Has tried ibuprofen which has been helpful.   Denies vaginal itching, burning, abnormal discharge. Denies pain with intercourse.     Review of Systems:  Also a 12 point comprehensive review of systems was negative except as noted.     Medical, Surgical, Social and Family history reviewed.     Objective:   Vitals:    Vitals:    25 1331   BP: 129/81   Pulse: 66   Weight: 78.5 kg (173 lb 1.6 oz)   Height: 1.708 m (5' 7.25\")     Physical Exam:  General: Pleasant, articulate, well-groomed, well-nourished female.  Not in any apparent distress.  Abdomen: Soft, nontender, no masses, negative CVAT.  External genitalia: Normal hair distribution, no lesions.  Urethral opening: Without lesions, or tenderness.   Vagina: Pink, rugated, normal-appearing discharge.  Cervix: pink, smooth, no lesions.  IUD strings visualized. IUD itself is not visualized.   Bimanual: Finds uterus small, mobile, nontender, no masses.  Negative CMT.  Adnexa, without masses or tenderness.      Assessment/Plan:  ASSESSMENT / PLAN:  (R10.2) Pelvic pain  (primary encounter diagnosis)  Plan: UA with Microscopic reflex to Culture, US         Pelvic Complete with Transvaginal, Multiplex         Vaginal Panel by PCR, Chlamydia trachomatis         PCR, Neisseria gonorrhoeae PCR    (Z97.5) IUD (intrauterine device) in place  Plan: US Pelvic Complete with Transvaginal    - Discussed likely normal etiology related to IUD, and that more time might be needed to adjust. Discussed ibuprofen for pain consistently.   - Plan follow up after results.     "

## 2025-07-30 NOTE — PATIENT INSTRUCTIONS
Thank you for trusting us with your care!   Please be aware, if you are on Mychart, you may see your results prior to your providers' review. If labs are abnormal, we will call or message you on [employer] Easy with a follow up plan.    If you need to contact us for questions about:  Symptoms, Scheduling & Medical Questions: Non-urgent (2-3 day response), send Shipping Easy message. For urgent (needing response today), call [phone].   Prescriptions: Please call your Pharmacy   Billing: [place] [phone] or  Physicians: [phone]

## 2025-07-31 DIAGNOSIS — B37.31 CANDIDIASIS OF VAGINA: Primary | ICD-10-CM

## 2025-07-31 DIAGNOSIS — N76.0 ACUTE VAGINITIS: ICD-10-CM

## 2025-07-31 LAB
C TRACH DNA SPEC QL NAA+PROBE: NEGATIVE
N GONORRHOEA DNA SPEC QL NAA+PROBE: NEGATIVE
SPECIMEN TYPE: NORMAL
SPECIMEN TYPE: NORMAL

## 2025-07-31 RX ORDER — FLUCONAZOLE 150 MG/1
150 TABLET ORAL SEE ADMIN INSTRUCTIONS
Qty: 2 TABLET | Refills: 0 | Status: SHIPPED | OUTPATIENT
Start: 2025-07-31

## 2025-07-31 RX ORDER — METRONIDAZOLE 500 MG/1
500 TABLET ORAL 2 TIMES DAILY
Qty: 14 TABLET | Refills: 0 | Status: SHIPPED | OUTPATIENT
Start: 2025-07-31 | End: 2025-08-07

## 2025-08-06 ENCOUNTER — ANCILLARY PROCEDURE (OUTPATIENT)
Dept: ULTRASOUND IMAGING | Facility: CLINIC | Age: 25
End: 2025-08-06
Payer: COMMERCIAL

## 2025-08-06 DIAGNOSIS — R10.2 PELVIC PAIN: ICD-10-CM

## 2025-08-06 DIAGNOSIS — Z97.5 IUD (INTRAUTERINE DEVICE) IN PLACE: ICD-10-CM

## 2025-08-06 PROCEDURE — 76830 TRANSVAGINAL US NON-OB: CPT | Mod: 26 | Performed by: STUDENT IN AN ORGANIZED HEALTH CARE EDUCATION/TRAINING PROGRAM

## 2025-08-06 PROCEDURE — 76830 TRANSVAGINAL US NON-OB: CPT

## 2025-08-06 PROCEDURE — 76856 US EXAM PELVIC COMPLETE: CPT | Mod: 26 | Performed by: STUDENT IN AN ORGANIZED HEALTH CARE EDUCATION/TRAINING PROGRAM

## 2025-08-12 DIAGNOSIS — N92.0 MENORRHAGIA WITH REGULAR CYCLE: ICD-10-CM

## 2025-08-13 RX ORDER — NORGESTIMATE AND ETHINYL ESTRADIOL
1 KIT DAILY
Qty: 84 TABLET | Refills: 3 | OUTPATIENT
Start: 2025-08-13

## 2025-08-19 ENCOUNTER — VIRTUAL VISIT (OUTPATIENT)
Dept: FAMILY MEDICINE | Facility: CLINIC | Age: 25
End: 2025-08-19
Payer: COMMERCIAL

## 2025-08-19 DIAGNOSIS — B37.2 YEAST DERMATITIS: Primary | ICD-10-CM

## 2025-08-19 PROCEDURE — 98005 SYNCH AUDIO-VIDEO EST LOW 20: CPT | Performed by: FAMILY MEDICINE

## 2025-08-19 RX ORDER — FLUCONAZOLE 150 MG/1
150 TABLET ORAL
Qty: 10 TABLET | Refills: 0 | Status: SHIPPED | OUTPATIENT
Start: 2025-08-19

## (undated) RX ORDER — LIDOCAINE HYDROCHLORIDE 10 MG/ML
INJECTION, SOLUTION INFILTRATION; PERINEURAL
Status: DISPENSED
Start: 2025-02-19